# Patient Record
Sex: FEMALE
[De-identification: names, ages, dates, MRNs, and addresses within clinical notes are randomized per-mention and may not be internally consistent; named-entity substitution may affect disease eponyms.]

---

## 2017-08-29 PROBLEM — M17.0 BILATERAL PRIMARY OSTEOARTHRITIS OF KNEE: Status: ACTIVE | Noted: 2017-08-29

## 2017-10-24 PROBLEM — M77.02 GOLFER'S ELBOW, LEFT: Status: ACTIVE | Noted: 2017-10-24

## 2018-02-07 LAB
HCG SERPL QL: NEGATIVE
HGB BLD-MCNC: 15.3 GM/DL (ref 12–15.5)
INR PPP: 1
PROTHROMBIN TIME: 10.3 SECONDS (ref 9.7–11.8)
PROTHROMBIN TIME: NORMAL

## 2018-02-14 ENCOUNTER — HOSPITAL (OUTPATIENT)
Dept: OTHER | Age: 48
End: 2018-02-14
Attending: ORTHOPAEDIC SURGERY

## 2018-02-14 ENCOUNTER — CHARTING TRANS (OUTPATIENT)
Dept: OTHER | Age: 48
End: 2018-02-14

## 2018-02-14 LAB
ANALYZER ANC (IANC): ABNORMAL
BASOPHILS # BLD: 0 THOUSAND/MCL (ref 0–0.3)
BASOPHILS NFR BLD: 0 %
DIFFERENTIAL METHOD BLD: ABNORMAL
EOSINOPHIL # BLD: 0.1 THOUSAND/MCL (ref 0.1–0.5)
EOSINOPHIL NFR BLD: 1 %
ERYTHROCYTE [DISTWIDTH] IN BLOOD: 14 % (ref 11–15)
HEMATOCRIT: 33.6 % (ref 36–46.5)
HEMATOCRIT: 41.4 % (ref 36–46.5)
HGB BLD-MCNC: 10.8 GM/DL (ref 12–15.5)
HGB BLD-MCNC: 13.6 GM/DL (ref 12–15.5)
LYMPHOCYTES # BLD: 2.2 THOUSAND/MCL (ref 1–4.8)
LYMPHOCYTES NFR BLD: 14 %
MCH RBC QN AUTO: 29.8 PG (ref 26–34)
MCHC RBC AUTO-ENTMCNC: 32.1 GM/DL (ref 32–36.5)
MCV RBC AUTO: 92.8 FL (ref 78–100)
MONOCYTES # BLD: 1.3 THOUSAND/MCL (ref 0.3–0.9)
MONOCYTES NFR BLD: 8 %
NEUTROPHILS # BLD: 12.3 THOUSAND/MCL (ref 1.8–7.7)
NEUTROPHILS NFR BLD: 77 %
NEUTS SEG NFR BLD: ABNORMAL %
PERCENT NRBC: ABNORMAL
PLATELET # BLD: 357 THOUSAND/MCL (ref 140–450)
RBC # BLD: 3.62 MILLION/MCL (ref 4–5.2)
WBC # BLD: 15.9 THOUSAND/MCL (ref 4.2–11)

## 2018-02-15 ENCOUNTER — DIAGNOSTIC TRANS (OUTPATIENT)
Dept: OTHER | Age: 48
End: 2018-02-15

## 2018-02-15 ENCOUNTER — CHARTING TRANS (OUTPATIENT)
Dept: OTHER | Age: 48
End: 2018-02-15

## 2018-02-15 LAB
ALBUMIN SERPL-MCNC: 2.5 GM/DL (ref 3.6–5.1)
ALBUMIN/GLOB SERPL: 0.7 {RATIO} (ref 1–2.4)
ALP SERPL-CCNC: 62 UNIT/L (ref 45–117)
ALT SERPL-CCNC: 20 UNIT/L
AMORPH SED URNS QL MICRO: ABNORMAL
ANALYZER ANC (IANC): ABNORMAL
ANION GAP SERPL CALC-SCNC: 16 MMOL/L (ref 10–20)
APPEARANCE UR: CLEAR
APTT PPP: 27 SECONDS (ref 22–30)
APTT PPP: NORMAL S
AST SERPL-CCNC: 37 UNIT/L
BACTERIA #/AREA URNS HPF: ABNORMAL /HPF
BASOPHILS # BLD: 0.1 THOUSAND/MCL (ref 0–0.3)
BASOPHILS NFR BLD: 1 %
BILIRUB SERPL-MCNC: 0.9 MG/DL (ref 0.2–1)
BILIRUB UR QL: NEGATIVE
BUN SERPL-MCNC: 14 MG/DL (ref 6–20)
BUN/CREAT SERPL: 13 (ref 7–25)
CALCIUM SERPL-MCNC: 7.6 MG/DL (ref 8.4–10.2)
CAOX CRY URNS QL MICRO: ABNORMAL
CHLORIDE: 101 MMOL/L (ref 98–107)
CO2 SERPL-SCNC: 20 MMOL/L (ref 21–32)
COLOR UR: YELLOW
CREAT SERPL-MCNC: 1.09 MG/DL (ref 0.51–0.95)
DIFFERENTIAL METHOD BLD: ABNORMAL
EOSINOPHIL # BLD: 0 THOUSAND/MCL (ref 0.1–0.5)
EOSINOPHIL NFR BLD: 0 %
EPITH CASTS #/AREA URNS LPF: ABNORMAL /[LPF]
ERYTHROCYTE [DISTWIDTH] IN BLOOD: 14 % (ref 11–15)
FATTY CASTS #/AREA URNS LPF: ABNORMAL /[LPF]
GLOBULIN SER-MCNC: 3.7 GM/DL (ref 2–4)
GLUCOSE BLDC GLUCOMTR-MCNC: 134 MG/DL (ref 65–99)
GLUCOSE SERPL-MCNC: 144 MG/DL (ref 65–99)
GLUCOSE UR-MCNC: NEGATIVE MG/DL
GRAN CASTS #/AREA URNS LPF: ABNORMAL /[LPF]
HEMATOCRIT: 31.9 % (ref 36–46.5)
HEMATOCRIT: 33.3 % (ref 36–46.5)
HGB BLD-MCNC: 10.4 GM/DL (ref 12–15.5)
HGB BLD-MCNC: 10.7 GM/DL (ref 12–15.5)
HGB UR QL: ABNORMAL
HYALINE CASTS #/AREA URNS LPF: ABNORMAL /LPF (ref 0–5)
INR PPP: 1
KETONES UR-MCNC: NEGATIVE MG/DL
LACTATE BLDV-SCNC: 1.5 MMOL/L (ref 0–2)
LEUKOCYTE ESTERASE UR QL STRIP: NEGATIVE
LYMPHOCYTES # BLD: 2.1 THOUSAND/MCL (ref 1–4.8)
LYMPHOCYTES NFR BLD: 15 %
MCH RBC QN AUTO: 29.5 PG (ref 26–34)
MCHC RBC AUTO-ENTMCNC: 32.1 GM/DL (ref 32–36.5)
MCV RBC AUTO: 91.7 FL (ref 78–100)
METAMYELOCYTES NFR BLD: 1 % (ref 0–2)
MIXED CELL CASTS #/AREA URNS LPF: ABNORMAL /[LPF]
MONOCYTES # BLD: 1.3 THOUSAND/MCL (ref 0.3–0.9)
MONOCYTES NFR BLD: 10 %
MUCOUS THREADS URNS QL MICRO: ABNORMAL
NEUTROPHILS # BLD: 9.4 THOUSAND/MCL (ref 1.8–7.7)
NEUTS BAND NFR BLD: 2 % (ref 0–10)
NEUTS SEG NFR BLD: 70 %
NITRITE UR QL: NEGATIVE
PATH REV BLD -IMP: ABNORMAL
PH UR: 6 UNIT (ref 5–7)
PLAT MORPH BLD: NORMAL
PLATELET # BLD: 371 THOUSAND/MCL (ref 140–450)
POTASSIUM SERPL-SCNC: 4.7 MMOL/L (ref 3.4–5.1)
PROT SERPL-MCNC: 6.2 GM/DL (ref 6.4–8.2)
PROT UR QL: NEGATIVE MG/DL
PROTHROMBIN TIME: 11.1 SECONDS (ref 9.7–11.8)
PROTHROMBIN TIME: NORMAL
RBC # BLD: 3.63 MILLION/MCL (ref 4–5.2)
RBC #/AREA URNS HPF: ABNORMAL /HPF (ref 0–3)
RBC CASTS #/AREA URNS LPF: ABNORMAL /[LPF]
RBC MORPH BLD: NORMAL
RENAL EPI CELLS #/AREA URNS HPF: ABNORMAL /[HPF]
SODIUM SERPL-SCNC: 132 MMOL/L (ref 135–145)
SP GR UR: 1.01 (ref 1–1.03)
SPECIMEN SOURCE: ABNORMAL
SPERM URNS QL MICRO: ABNORMAL
SQUAMOUS #/AREA URNS HPF: ABNORMAL /HPF (ref 0–5)
T VAGINALIS URNS QL MICRO: ABNORMAL
TRI-PHOS CRY URNS QL MICRO: ABNORMAL
URATE CRY URNS QL MICRO: ABNORMAL
URINE REFLEX: ABNORMAL
URNS CMNT MICRO: ABNORMAL
UROBILINOGEN UR QL: 0.2 MG/DL (ref 0–1)
VARIANT LYMPHS NFR BLD: 1 % (ref 0–5)
WAXY CASTS #/AREA URNS LPF: ABNORMAL /[LPF]
WBC # BLD: 13 THOUSAND/MCL (ref 4.2–11)
WBC #/AREA URNS HPF: ABNORMAL /HPF (ref 0–5)
WBC CASTS #/AREA URNS LPF: ABNORMAL /[LPF]
WBC MORPH BLD: NORMAL
YEAST HYPHAE URNS QL MICRO: ABNORMAL
YEAST URNS QL MICRO: ABNORMAL

## 2018-02-16 LAB
ANION GAP SERPL CALC-SCNC: 12 MMOL/L (ref 10–20)
BUN SERPL-MCNC: 11 MG/DL (ref 6–20)
BUN/CREAT SERPL: 11 (ref 7–25)
CALCIUM SERPL-MCNC: 7.8 MG/DL (ref 8.4–10.2)
CHLORIDE: 102 MMOL/L (ref 98–107)
CO2 SERPL-SCNC: 23 MMOL/L (ref 21–32)
CREAT SERPL-MCNC: 0.96 MG/DL (ref 0.51–0.95)
GLUCOSE SERPL-MCNC: 150 MG/DL (ref 65–99)
HEMATOCRIT: 27.1 % (ref 36–46.5)
HGB BLD-MCNC: 8.7 GM/DL (ref 12–15.5)
POTASSIUM SERPL-SCNC: 4.3 MMOL/L (ref 3.4–5.1)
SODIUM SERPL-SCNC: 133 MMOL/L (ref 135–145)
T3FREE SERPL-MCNC: 1.7 PG/ML (ref 2.2–4)
T4 FREE SERPL-MCNC: 1.4 NG/DL (ref 0.8–1.5)
TSH SERPL-ACNC: 0.22 MCUNIT/ML (ref 0.35–5)
WBC # BLD: 14.9 THOUSAND/MCL (ref 4.2–11)

## 2018-02-17 LAB
ALBUMIN SERPL-MCNC: 1.9 GM/DL (ref 3.6–5.1)
ALP SERPL-CCNC: 68 UNIT/L (ref 45–117)
ALT SERPL-CCNC: 15 UNIT/L
ANALYZER ANC (IANC): ABNORMAL
ANION GAP SERPL CALC-SCNC: 13 MMOL/L (ref 10–20)
AST SERPL-CCNC: 45 UNIT/L
BILIRUB CONJ SERPL-MCNC: 0.2 MG/DL (ref 0–0.2)
BILIRUB SERPL-MCNC: 0.7 MG/DL (ref 0.2–1)
BUN SERPL-MCNC: 8 MG/DL (ref 6–20)
BUN/CREAT SERPL: 12 (ref 7–25)
CALCIUM SERPL-MCNC: 8 MG/DL (ref 8.4–10.2)
CHLORIDE: 104 MMOL/L (ref 98–107)
CO2 SERPL-SCNC: 24 MMOL/L (ref 21–32)
CREAT SERPL-MCNC: 0.65 MG/DL (ref 0.51–0.95)
ERYTHROCYTE [DISTWIDTH] IN BLOOD: 13.9 % (ref 11–15)
GLUCOSE SERPL-MCNC: 119 MG/DL (ref 65–99)
HAPTOGLOB SERPL-MCNC: 264 MG/DL (ref 36–195)
HEMATOCRIT: 24.1 % (ref 36–46.5)
HEMATOCRIT: 25.2 % (ref 36–46.5)
HGB BLD-MCNC: 8 GM/DL (ref 12–15.5)
HGB BLD-MCNC: 8.3 GM/DL (ref 12–15.5)
IMMATURE RETIC FRACTION: 15.8 % (ref 1.5–16)
LDH SERPL-CCNC: 241 UNIT/L (ref 82–240)
MCH RBC QN AUTO: 30.3 PG (ref 26–34)
MCHC RBC AUTO-ENTMCNC: 33.2 GM/DL (ref 32–36.5)
MCV RBC AUTO: 91.3 FL (ref 78–100)
PLATELET # BLD: 262 THOUSAND/MCL (ref 140–450)
POTASSIUM SERPL-SCNC: 3.7 MMOL/L (ref 3.4–5.1)
PROT SERPL-MCNC: 5.8 GM/DL (ref 6.4–8.2)
RBC # BLD: 2.64 MILLION/MCL (ref 4–5.2)
RETICS #: 71 THOUSAND/MCL (ref 10–120)
RETICS/RBC NFR: 2.6 % (ref 0.3–2.5)
SODIUM SERPL-SCNC: 137 MMOL/L (ref 135–145)
WBC # BLD: 12.5 THOUSAND/MCL (ref 4.2–11)

## 2018-02-18 LAB
ANALYZER ANC (IANC): ABNORMAL
ANION GAP SERPL CALC-SCNC: 11 MMOL/L (ref 10–20)
BUN SERPL-MCNC: 8 MG/DL (ref 6–20)
BUN/CREAT SERPL: 16 (ref 7–25)
CALCIUM SERPL-MCNC: 8 MG/DL (ref 8.4–10.2)
CHLORIDE: 107 MMOL/L (ref 98–107)
CO2 SERPL-SCNC: 25 MMOL/L (ref 21–32)
CREAT SERPL-MCNC: 0.49 MG/DL (ref 0.51–0.95)
ERYTHROCYTE [DISTWIDTH] IN BLOOD: 14.3 % (ref 11–15)
GLUCOSE SERPL-MCNC: 103 MG/DL (ref 65–99)
HEMATOCRIT: 22.6 % (ref 36–46.5)
HEMATOCRIT: 25.9 % (ref 36–46.5)
HGB BLD-MCNC: 7.4 GM/DL (ref 12–15.5)
HGB BLD-MCNC: 8.6 GM/DL (ref 12–15.5)
MAGNESIUM SERPL-MCNC: 1.8 MG/DL (ref 1.7–2.4)
MCH RBC QN AUTO: 30 PG (ref 26–34)
MCHC RBC AUTO-ENTMCNC: 32.7 GM/DL (ref 32–36.5)
MCV RBC AUTO: 91.5 FL (ref 78–100)
PLATELET # BLD: 324 THOUSAND/MCL (ref 140–450)
POTASSIUM SERPL-SCNC: 3.4 MMOL/L (ref 3.4–5.1)
RBC # BLD: 2.47 MILLION/MCL (ref 4–5.2)
SODIUM SERPL-SCNC: 140 MMOL/L (ref 135–145)
WBC # BLD: 10.9 THOUSAND/MCL (ref 4.2–11)

## 2018-02-19 LAB
ANALYZER ANC (IANC): ABNORMAL
ANION GAP SERPL CALC-SCNC: 10 MMOL/L (ref 10–20)
BUN SERPL-MCNC: 8 MG/DL (ref 6–20)
BUN/CREAT SERPL: 18 (ref 7–25)
CALCIUM SERPL-MCNC: 8.2 MG/DL (ref 8.4–10.2)
CHLORIDE: 107 MMOL/L (ref 98–107)
CO2 SERPL-SCNC: 26 MMOL/L (ref 21–32)
CREAT SERPL-MCNC: 0.45 MG/DL (ref 0.51–0.95)
ERYTHROCYTE [DISTWIDTH] IN BLOOD: 14.4 % (ref 11–15)
GLUCOSE SERPL-MCNC: 101 MG/DL (ref 65–99)
HEMATOCRIT: 25.2 % (ref 36–46.5)
HEMATOCRIT: 27.2 % (ref 36–46.5)
HGB BLD-MCNC: 8.4 GM/DL (ref 12–15.5)
HGB BLD-MCNC: 8.9 GM/DL (ref 12–15.5)
MAGNESIUM SERPL-MCNC: 2 MG/DL (ref 1.7–2.4)
MCH RBC QN AUTO: 30.5 PG (ref 26–34)
MCHC RBC AUTO-ENTMCNC: 33.3 GM/DL (ref 32–36.5)
MCV RBC AUTO: 91.6 FL (ref 78–100)
PLATELET # BLD: 384 THOUSAND/MCL (ref 140–450)
POTASSIUM SERPL-SCNC: 3.8 MMOL/L (ref 3.4–5.1)
RBC # BLD: 2.75 MILLION/MCL (ref 4–5.2)
SODIUM SERPL-SCNC: 139 MMOL/L (ref 135–145)
WBC # BLD: 8.7 THOUSAND/MCL (ref 4.2–11)

## 2018-02-20 LAB
HEMATOCRIT: 26.9 % (ref 36–46.5)
HGB BLD-MCNC: 8.7 GM/DL (ref 12–15.5)

## 2018-04-02 PROBLEM — Z96.653 HISTORY OF KNEE REPLACEMENT, TOTAL, BILATERAL: Status: ACTIVE | Noted: 2018-04-02

## 2018-04-05 PROBLEM — S76.112A RUPTURE OF LEFT QUADRICEPS MUSCLE, INITIAL ENCOUNTER: Status: ACTIVE | Noted: 2018-04-05

## 2018-04-06 RX ORDER — CETIRIZINE HYDROCHLORIDE 10 MG/1
10 TABLET ORAL NIGHTLY
COMMUNITY
End: 2020-03-02

## 2018-04-06 RX ORDER — GLUCOSAMINE HCL 500 MG
1 TABLET ORAL DAILY
COMMUNITY
End: 2020-03-02

## 2018-04-06 RX ORDER — GABAPENTIN 300 MG/1
300 CAPSULE ORAL 3 TIMES DAILY
COMMUNITY
End: 2018-04-27

## 2018-04-06 RX ORDER — DEXTROAMPHETAMINE SACCHARATE, AMPHETAMINE ASPARTATE, DEXTROAMPHETAMINE SULFATE AND AMPHETAMINE SULFATE 2.5; 2.5; 2.5; 2.5 MG/1; MG/1; MG/1; MG/1
10 TABLET ORAL DAILY
COMMUNITY
End: 2018-05-25 | Stop reason: DRUGHIGH

## 2018-04-06 RX ORDER — MONTELUKAST SODIUM 10 MG/1
10 TABLET ORAL NIGHTLY
COMMUNITY
End: 2018-04-27 | Stop reason: ALTCHOICE

## 2018-04-07 ENCOUNTER — LAB ENCOUNTER (OUTPATIENT)
Dept: LAB | Facility: HOSPITAL | Age: 48
End: 2018-04-07
Attending: ORTHOPAEDIC SURGERY
Payer: COMMERCIAL

## 2018-04-07 DIAGNOSIS — Z01.818 PREOP TESTING: ICD-10-CM

## 2018-04-07 PROCEDURE — 87641 MR-STAPH DNA AMP PROBE: CPT

## 2018-04-11 ENCOUNTER — ANESTHESIA EVENT (OUTPATIENT)
Dept: SURGERY | Facility: HOSPITAL | Age: 48
End: 2018-04-11
Payer: COMMERCIAL

## 2018-04-11 ENCOUNTER — SURGERY (OUTPATIENT)
Age: 48
End: 2018-04-11

## 2018-04-11 ENCOUNTER — ANESTHESIA (OUTPATIENT)
Dept: SURGERY | Facility: HOSPITAL | Age: 48
End: 2018-04-11
Payer: COMMERCIAL

## 2018-04-11 ENCOUNTER — HOSPITAL ENCOUNTER (OUTPATIENT)
Facility: HOSPITAL | Age: 48
Discharge: HOME HEALTH | End: 2018-04-12
Attending: ORTHOPAEDIC SURGERY | Admitting: ORTHOPAEDIC SURGERY
Payer: COMMERCIAL

## 2018-04-11 DIAGNOSIS — S76.112A RUPTURE OF QUADRICEPS MUSCLE, LEFT, INITIAL ENCOUNTER: ICD-10-CM

## 2018-04-11 DIAGNOSIS — M25.661 STIFFNESS OF KNEE JOINT, RIGHT: ICD-10-CM

## 2018-04-11 DIAGNOSIS — Z01.818 PREOP TESTING: Primary | ICD-10-CM

## 2018-04-11 LAB — B-HCG UR QL: NEGATIVE

## 2018-04-11 PROCEDURE — 0SND0ZZ RELEASE LEFT KNEE JOINT, OPEN APPROACH: ICD-10-PCS | Performed by: ORTHOPAEDIC SURGERY

## 2018-04-11 PROCEDURE — 0LQM0ZZ REPAIR LEFT UPPER LEG TENDON, OPEN APPROACH: ICD-10-PCS | Performed by: ORTHOPAEDIC SURGERY

## 2018-04-11 PROCEDURE — 99152 MOD SED SAME PHYS/QHP 5/>YRS: CPT | Performed by: ORTHOPAEDIC SURGERY

## 2018-04-11 PROCEDURE — 64447 NJX AA&/STRD FEMORAL NRV IMG: CPT | Performed by: ORTHOPAEDIC SURGERY

## 2018-04-11 PROCEDURE — 0JQP0ZZ REPAIR LEFT LOWER LEG SUBCUTANEOUS TISSUE AND FASCIA, OPEN APPROACH: ICD-10-PCS | Performed by: ORTHOPAEDIC SURGERY

## 2018-04-11 PROCEDURE — 0SNCXZZ RELEASE RIGHT KNEE JOINT, EXTERNAL APPROACH: ICD-10-PCS | Performed by: ORTHOPAEDIC SURGERY

## 2018-04-11 PROCEDURE — 3E0T3BZ INTRODUCTION OF ANESTHETIC AGENT INTO PERIPHERAL NERVES AND PLEXI, PERCUTANEOUS APPROACH: ICD-10-PCS | Performed by: ANESTHESIOLOGY

## 2018-04-11 PROCEDURE — 94660 CPAP INITIATION&MGMT: CPT

## 2018-04-11 PROCEDURE — 81025 URINE PREGNANCY TEST: CPT

## 2018-04-11 PROCEDURE — 76942 ECHO GUIDE FOR BIOPSY: CPT | Performed by: ORTHOPAEDIC SURGERY

## 2018-04-11 RX ORDER — BUPROPION HYDROCHLORIDE 100 MG/1
150 TABLET ORAL DAILY
Status: DISCONTINUED | OUTPATIENT
Start: 2018-04-11 | End: 2018-04-12

## 2018-04-11 RX ORDER — HYDROCODONE BITARTRATE AND ACETAMINOPHEN 10; 325 MG/1; MG/1
1 TABLET ORAL EVERY 4 HOURS PRN
Status: DISCONTINUED | OUTPATIENT
Start: 2018-04-11 | End: 2018-04-12

## 2018-04-11 RX ORDER — DEXAMETHASONE SODIUM PHOSPHATE 4 MG/ML
VIAL (ML) INJECTION AS NEEDED
Status: DISCONTINUED | OUTPATIENT
Start: 2018-04-11 | End: 2018-04-11 | Stop reason: SURG

## 2018-04-11 RX ORDER — MORPHINE SULFATE 4 MG/ML
2 INJECTION, SOLUTION INTRAMUSCULAR; INTRAVENOUS EVERY 10 MIN PRN
Status: DISCONTINUED | OUTPATIENT
Start: 2018-04-11 | End: 2018-04-11 | Stop reason: HOSPADM

## 2018-04-11 RX ORDER — HALOPERIDOL 5 MG/ML
0.25 INJECTION INTRAMUSCULAR ONCE AS NEEDED
Status: DISCONTINUED | OUTPATIENT
Start: 2018-04-11 | End: 2018-04-11 | Stop reason: HOSPADM

## 2018-04-11 RX ORDER — SODIUM CHLORIDE, SODIUM LACTATE, POTASSIUM CHLORIDE, CALCIUM CHLORIDE 600; 310; 30; 20 MG/100ML; MG/100ML; MG/100ML; MG/100ML
INJECTION, SOLUTION INTRAVENOUS CONTINUOUS
Status: DISCONTINUED | OUTPATIENT
Start: 2018-04-11 | End: 2018-04-11 | Stop reason: HOSPADM

## 2018-04-11 RX ORDER — ASPIRIN 325 MG
325 TABLET ORAL DAILY
Status: DISCONTINUED | OUTPATIENT
Start: 2018-04-12 | End: 2018-04-12

## 2018-04-11 RX ORDER — LABETALOL HYDROCHLORIDE 5 MG/ML
INJECTION, SOLUTION INTRAVENOUS AS NEEDED
Status: DISCONTINUED | OUTPATIENT
Start: 2018-04-11 | End: 2018-04-11 | Stop reason: SURG

## 2018-04-11 RX ORDER — ONDANSETRON 2 MG/ML
4 INJECTION INTRAMUSCULAR; INTRAVENOUS ONCE AS NEEDED
Status: DISCONTINUED | OUTPATIENT
Start: 2018-04-11 | End: 2018-04-11 | Stop reason: HOSPADM

## 2018-04-11 RX ORDER — BUPIVACAINE HYDROCHLORIDE 2.5 MG/ML
INJECTION, SOLUTION EPIDURAL; INFILTRATION; INTRACAUDAL AS NEEDED
Status: DISCONTINUED | OUTPATIENT
Start: 2018-04-11 | End: 2018-04-11

## 2018-04-11 RX ORDER — HYDROCODONE BITARTRATE AND ACETAMINOPHEN 5; 325 MG/1; MG/1
2 TABLET ORAL AS NEEDED
Status: DISCONTINUED | OUTPATIENT
Start: 2018-04-11 | End: 2018-04-11 | Stop reason: HOSPADM

## 2018-04-11 RX ORDER — BACLOFEN 10 MG/1
5 TABLET ORAL 2 TIMES DAILY
Status: DISCONTINUED | OUTPATIENT
Start: 2018-04-11 | End: 2018-04-12

## 2018-04-11 RX ORDER — KETOROLAC TROMETHAMINE 30 MG/ML
30 INJECTION, SOLUTION INTRAMUSCULAR; INTRAVENOUS ONCE
Status: COMPLETED | OUTPATIENT
Start: 2018-04-11 | End: 2018-04-11

## 2018-04-11 RX ORDER — MORPHINE SULFATE 10 MG/ML
6 INJECTION, SOLUTION INTRAMUSCULAR; INTRAVENOUS EVERY 10 MIN PRN
Status: DISCONTINUED | OUTPATIENT
Start: 2018-04-11 | End: 2018-04-11 | Stop reason: HOSPADM

## 2018-04-11 RX ORDER — ROPIVACAINE HYDROCHLORIDE 5 MG/ML
INJECTION, SOLUTION EPIDURAL; INFILTRATION; PERINEURAL AS NEEDED
Status: DISCONTINUED | OUTPATIENT
Start: 2018-04-11 | End: 2018-04-11 | Stop reason: SURG

## 2018-04-11 RX ORDER — HYDROCODONE BITARTRATE AND ACETAMINOPHEN 10; 325 MG/1; MG/1
2 TABLET ORAL EVERY 4 HOURS PRN
Status: DISCONTINUED | OUTPATIENT
Start: 2018-04-11 | End: 2018-04-12

## 2018-04-11 RX ORDER — LIDOCAINE HYDROCHLORIDE 10 MG/ML
INJECTION, SOLUTION EPIDURAL; INFILTRATION; INTRACAUDAL; PERINEURAL AS NEEDED
Status: DISCONTINUED | OUTPATIENT
Start: 2018-04-11 | End: 2018-04-11 | Stop reason: SURG

## 2018-04-11 RX ORDER — MORPHINE SULFATE 2 MG/ML
1 INJECTION, SOLUTION INTRAMUSCULAR; INTRAVENOUS EVERY 2 HOUR PRN
Status: DISCONTINUED | OUTPATIENT
Start: 2018-04-11 | End: 2018-04-12

## 2018-04-11 RX ORDER — NEOSTIGMINE METHYLSULFATE 0.5 MG/ML
INJECTION INTRAVENOUS AS NEEDED
Status: DISCONTINUED | OUTPATIENT
Start: 2018-04-11 | End: 2018-04-11 | Stop reason: SURG

## 2018-04-11 RX ORDER — FAMOTIDINE 20 MG/1
20 TABLET ORAL ONCE
Status: COMPLETED | OUTPATIENT
Start: 2018-04-11 | End: 2018-04-11

## 2018-04-11 RX ORDER — ATORVASTATIN CALCIUM 10 MG/1
10 TABLET, FILM COATED ORAL NIGHTLY
Status: DISCONTINUED | OUTPATIENT
Start: 2018-04-11 | End: 2018-04-12

## 2018-04-11 RX ORDER — MORPHINE SULFATE 4 MG/ML
4 INJECTION, SOLUTION INTRAMUSCULAR; INTRAVENOUS EVERY 10 MIN PRN
Status: DISCONTINUED | OUTPATIENT
Start: 2018-04-11 | End: 2018-04-11 | Stop reason: HOSPADM

## 2018-04-11 RX ORDER — MIDAZOLAM HYDROCHLORIDE 1 MG/ML
INJECTION INTRAMUSCULAR; INTRAVENOUS AS NEEDED
Status: DISCONTINUED | OUTPATIENT
Start: 2018-04-11 | End: 2018-04-11 | Stop reason: SURG

## 2018-04-11 RX ORDER — DOCUSATE SODIUM 100 MG/1
100 CAPSULE, LIQUID FILLED ORAL 2 TIMES DAILY
Status: DISCONTINUED | OUTPATIENT
Start: 2018-04-11 | End: 2018-04-12

## 2018-04-11 RX ORDER — NALOXONE HYDROCHLORIDE 0.4 MG/ML
80 INJECTION, SOLUTION INTRAMUSCULAR; INTRAVENOUS; SUBCUTANEOUS AS NEEDED
Status: DISCONTINUED | OUTPATIENT
Start: 2018-04-11 | End: 2018-04-11 | Stop reason: HOSPADM

## 2018-04-11 RX ORDER — MONTELUKAST SODIUM 10 MG/1
10 TABLET ORAL NIGHTLY
Status: DISCONTINUED | OUTPATIENT
Start: 2018-04-11 | End: 2018-04-12

## 2018-04-11 RX ORDER — ONDANSETRON 2 MG/ML
INJECTION INTRAMUSCULAR; INTRAVENOUS AS NEEDED
Status: DISCONTINUED | OUTPATIENT
Start: 2018-04-11 | End: 2018-04-11 | Stop reason: SURG

## 2018-04-11 RX ORDER — MAGNESIUM HYDROXIDE 1200 MG/15ML
LIQUID ORAL CONTINUOUS PRN
Status: DISCONTINUED | OUTPATIENT
Start: 2018-04-11 | End: 2018-04-11

## 2018-04-11 RX ORDER — HYDROMORPHONE HYDROCHLORIDE 1 MG/ML
0.6 INJECTION, SOLUTION INTRAMUSCULAR; INTRAVENOUS; SUBCUTANEOUS EVERY 5 MIN PRN
Status: DISCONTINUED | OUTPATIENT
Start: 2018-04-11 | End: 2018-04-11 | Stop reason: HOSPADM

## 2018-04-11 RX ORDER — GABAPENTIN 300 MG/1
300 CAPSULE ORAL 3 TIMES DAILY
Status: DISCONTINUED | OUTPATIENT
Start: 2018-04-11 | End: 2018-04-12

## 2018-04-11 RX ORDER — OXYCODONE HYDROCHLORIDE 5 MG/1
5 TABLET ORAL EVERY 4 HOURS PRN
Status: DISCONTINUED | OUTPATIENT
Start: 2018-04-11 | End: 2018-04-12

## 2018-04-11 RX ORDER — ACETAMINOPHEN 325 MG/1
650 TABLET ORAL EVERY 4 HOURS PRN
Status: DISCONTINUED | OUTPATIENT
Start: 2018-04-11 | End: 2018-04-12

## 2018-04-11 RX ORDER — ACETAMINOPHEN 500 MG
1000 TABLET ORAL ONCE
Status: COMPLETED | OUTPATIENT
Start: 2018-04-11 | End: 2018-04-11

## 2018-04-11 RX ORDER — ESCITALOPRAM OXALATE 10 MG/1
20 TABLET ORAL DAILY
Status: DISCONTINUED | OUTPATIENT
Start: 2018-04-11 | End: 2018-04-12

## 2018-04-11 RX ORDER — POLYETHYLENE GLYCOL 3350 17 G/17G
17 POWDER, FOR SOLUTION ORAL DAILY PRN
Status: DISCONTINUED | OUTPATIENT
Start: 2018-04-11 | End: 2018-04-12

## 2018-04-11 RX ORDER — ONDANSETRON 2 MG/ML
4 INJECTION INTRAMUSCULAR; INTRAVENOUS EVERY 6 HOURS PRN
Status: DISCONTINUED | OUTPATIENT
Start: 2018-04-11 | End: 2018-04-12

## 2018-04-11 RX ORDER — MORPHINE SULFATE 4 MG/ML
4 INJECTION, SOLUTION INTRAMUSCULAR; INTRAVENOUS EVERY 2 HOUR PRN
Status: DISCONTINUED | OUTPATIENT
Start: 2018-04-11 | End: 2018-04-12

## 2018-04-11 RX ORDER — MORPHINE SULFATE 15 MG/1
15 TABLET ORAL EVERY 4 HOURS PRN
Status: DISCONTINUED | OUTPATIENT
Start: 2018-04-11 | End: 2018-04-12

## 2018-04-11 RX ORDER — BISACODYL 10 MG
10 SUPPOSITORY, RECTAL RECTAL
Status: DISCONTINUED | OUTPATIENT
Start: 2018-04-11 | End: 2018-04-12

## 2018-04-11 RX ORDER — ACETAMINOPHEN 500 MG
1000 TABLET ORAL EVERY 8 HOURS
Status: DISCONTINUED | OUTPATIENT
Start: 2018-04-11 | End: 2018-04-12

## 2018-04-11 RX ORDER — SODIUM PHOSPHATE, DIBASIC AND SODIUM PHOSPHATE, MONOBASIC 7; 19 G/133ML; G/133ML
1 ENEMA RECTAL ONCE AS NEEDED
Status: DISCONTINUED | OUTPATIENT
Start: 2018-04-11 | End: 2018-04-12

## 2018-04-11 RX ORDER — ROCURONIUM BROMIDE 10 MG/ML
INJECTION, SOLUTION INTRAVENOUS AS NEEDED
Status: DISCONTINUED | OUTPATIENT
Start: 2018-04-11 | End: 2018-04-11 | Stop reason: SURG

## 2018-04-11 RX ORDER — DIAZEPAM 5 MG/1
5 TABLET ORAL NIGHTLY PRN
Status: DISCONTINUED | OUTPATIENT
Start: 2018-04-11 | End: 2018-04-12

## 2018-04-11 RX ORDER — TRAZODONE HYDROCHLORIDE 50 MG/1
50 TABLET ORAL NIGHTLY PRN
Status: DISCONTINUED | OUTPATIENT
Start: 2018-04-11 | End: 2018-04-12

## 2018-04-11 RX ORDER — ALBUTEROL SULFATE 90 UG/1
1 AEROSOL, METERED RESPIRATORY (INHALATION) EVERY 6 HOURS PRN
Status: DISCONTINUED | OUTPATIENT
Start: 2018-04-11 | End: 2018-04-12

## 2018-04-11 RX ORDER — HYDROMORPHONE HYDROCHLORIDE 1 MG/ML
0.4 INJECTION, SOLUTION INTRAMUSCULAR; INTRAVENOUS; SUBCUTANEOUS EVERY 5 MIN PRN
Status: DISCONTINUED | OUTPATIENT
Start: 2018-04-11 | End: 2018-04-11 | Stop reason: HOSPADM

## 2018-04-11 RX ORDER — DEXAMETHASONE SODIUM PHOSPHATE 10 MG/ML
INJECTION, SOLUTION INTRAMUSCULAR; INTRAVENOUS AS NEEDED
Status: DISCONTINUED | OUTPATIENT
Start: 2018-04-11 | End: 2018-04-11 | Stop reason: SURG

## 2018-04-11 RX ORDER — HYDROCODONE BITARTRATE AND ACETAMINOPHEN 5; 325 MG/1; MG/1
1 TABLET ORAL AS NEEDED
Status: DISCONTINUED | OUTPATIENT
Start: 2018-04-11 | End: 2018-04-11 | Stop reason: HOSPADM

## 2018-04-11 RX ORDER — MORPHINE SULFATE 2 MG/ML
2 INJECTION, SOLUTION INTRAMUSCULAR; INTRAVENOUS EVERY 2 HOUR PRN
Status: DISCONTINUED | OUTPATIENT
Start: 2018-04-11 | End: 2018-04-12

## 2018-04-11 RX ORDER — HYDROMORPHONE HYDROCHLORIDE 1 MG/ML
0.2 INJECTION, SOLUTION INTRAMUSCULAR; INTRAVENOUS; SUBCUTANEOUS EVERY 5 MIN PRN
Status: DISCONTINUED | OUTPATIENT
Start: 2018-04-11 | End: 2018-04-11 | Stop reason: HOSPADM

## 2018-04-11 RX ORDER — GLYCOPYRROLATE 0.2 MG/ML
INJECTION INTRAMUSCULAR; INTRAVENOUS AS NEEDED
Status: DISCONTINUED | OUTPATIENT
Start: 2018-04-11 | End: 2018-04-11 | Stop reason: SURG

## 2018-04-11 RX ORDER — CYCLOBENZAPRINE HCL 10 MG
10 TABLET ORAL 3 TIMES DAILY PRN
Status: DISCONTINUED | OUTPATIENT
Start: 2018-04-11 | End: 2018-04-12

## 2018-04-11 RX ORDER — CETIRIZINE HYDROCHLORIDE 10 MG/1
10 TABLET ORAL NIGHTLY
Status: DISCONTINUED | OUTPATIENT
Start: 2018-04-11 | End: 2018-04-12

## 2018-04-11 RX ORDER — OXYCODONE HYDROCHLORIDE 5 MG/1
10 TABLET ORAL EVERY 4 HOURS PRN
Status: DISCONTINUED | OUTPATIENT
Start: 2018-04-11 | End: 2018-04-12

## 2018-04-11 RX ORDER — LIDOCAINE HYDROCHLORIDE 40 MG/ML
SOLUTION TOPICAL AS NEEDED
Status: DISCONTINUED | OUTPATIENT
Start: 2018-04-11 | End: 2018-04-11 | Stop reason: SURG

## 2018-04-11 RX ORDER — MORPHINE SULFATE 4 MG/ML
6 INJECTION, SOLUTION INTRAMUSCULAR; INTRAVENOUS EVERY 2 HOUR PRN
Status: DISCONTINUED | OUTPATIENT
Start: 2018-04-11 | End: 2018-04-12

## 2018-04-11 RX ORDER — SODIUM CHLORIDE, SODIUM LACTATE, POTASSIUM CHLORIDE, CALCIUM CHLORIDE 600; 310; 30; 20 MG/100ML; MG/100ML; MG/100ML; MG/100ML
INJECTION, SOLUTION INTRAVENOUS CONTINUOUS
Status: DISCONTINUED | OUTPATIENT
Start: 2018-04-11 | End: 2018-04-12

## 2018-04-11 RX ORDER — SODIUM CHLORIDE 0.9 % (FLUSH) 0.9 %
10 SYRINGE (ML) INJECTION AS NEEDED
Status: DISCONTINUED | OUTPATIENT
Start: 2018-04-11 | End: 2018-04-12

## 2018-04-11 RX ADMIN — ROCURONIUM BROMIDE 30 MG: 10 INJECTION, SOLUTION INTRAVENOUS at 15:40:00

## 2018-04-11 RX ADMIN — DEXAMETHASONE SODIUM PHOSPHATE 4 MG: 10 INJECTION, SOLUTION INTRAMUSCULAR; INTRAVENOUS at 14:44:00

## 2018-04-11 RX ADMIN — SODIUM CHLORIDE, SODIUM LACTATE, POTASSIUM CHLORIDE, CALCIUM CHLORIDE: 600; 310; 30; 20 INJECTION, SOLUTION INTRAVENOUS at 15:09:00

## 2018-04-11 RX ADMIN — MIDAZOLAM HYDROCHLORIDE 2 MG: 1 INJECTION INTRAMUSCULAR; INTRAVENOUS at 14:44:00

## 2018-04-11 RX ADMIN — ROPIVACAINE HYDROCHLORIDE 30 ML: 5 INJECTION, SOLUTION EPIDURAL; INFILTRATION; PERINEURAL at 14:44:00

## 2018-04-11 RX ADMIN — LABETALOL HYDROCHLORIDE 10 MG: 5 INJECTION, SOLUTION INTRAVENOUS at 15:35:00

## 2018-04-11 RX ADMIN — NEOSTIGMINE METHYLSULFATE 4 MG: 0.5 INJECTION INTRAVENOUS at 16:33:00

## 2018-04-11 RX ADMIN — ONDANSETRON 4 MG: 2 INJECTION INTRAMUSCULAR; INTRAVENOUS at 15:20:00

## 2018-04-11 RX ADMIN — LIDOCAINE HYDROCHLORIDE 50 MG: 10 INJECTION, SOLUTION EPIDURAL; INFILTRATION; INTRACAUDAL; PERINEURAL at 15:11:00

## 2018-04-11 RX ADMIN — DEXAMETHASONE SODIUM PHOSPHATE 4 MG: 4 MG/ML VIAL (ML) INJECTION at 15:20:00

## 2018-04-11 RX ADMIN — LABETALOL HYDROCHLORIDE 20 MG: 5 INJECTION, SOLUTION INTRAVENOUS at 15:45:00

## 2018-04-11 RX ADMIN — LIDOCAINE HYDROCHLORIDE 1 ML: 10 INJECTION, SOLUTION EPIDURAL; INFILTRATION; INTRACAUDAL; PERINEURAL at 14:44:00

## 2018-04-11 RX ADMIN — SODIUM CHLORIDE, SODIUM LACTATE, POTASSIUM CHLORIDE, CALCIUM CHLORIDE: 600; 310; 30; 20 INJECTION, SOLUTION INTRAVENOUS at 16:38:00

## 2018-04-11 RX ADMIN — GLYCOPYRROLATE 0.6 MG: 0.2 INJECTION INTRAMUSCULAR; INTRAVENOUS at 16:33:00

## 2018-04-11 RX ADMIN — LIDOCAINE HYDROCHLORIDE 4 ML: 40 SOLUTION TOPICAL at 15:11:00

## 2018-04-11 NOTE — ANESTHESIA PROCEDURE NOTES
Peripheral Block    Anesthesiologist:  Jessie Best  Performed by:   Anesthesiologist  Patient Location:  PACU  Start Time:  4/11/2018 2:44 PM  End Time:  4/11/2018 2:48 PM  Site Identification: ultrasound guided, real time ultrasound guided, surface landm

## 2018-04-11 NOTE — CONSULTS
[address]    History & Physical (or consult)    Blanche Liberty Patient Status:  Inpatient    1970 MRN [id number]   Location [facility] UNIT Attending Fredrick Gooden MD   [address] [facility] Procedure: LUMBAR / TRANSFORAMINAL EPIDURAL                STEROID INJECTION;  Surgeon: Greta Hidalgo DO;  Location: [address] [place] [address]  8/13/2015: INJECTION, ANESTHETIC/STEROID, TRANSFORAMINAL * Right      Comment: Procedure: Dat James diazepam (VALIUM) 5 MG Oral Tab Take 1 tablet (5 mg total) by mouth nightly as needed for Anxiety. PROAIR  (90 BASE) MCG/ACT Inhalation Aero Soln Inhale 1 puff into the lungs every 6 (six) hours as needed.      BuPROPion HCl (WELLBUTRIN) 75 MG Or HGB, HCT, MCV, MCH, MCHC, RDW, NEPRELIM, WBC, PLT in the last 72 hours. No results for input(s): GLU, BUN, CREATSERUM, GFRAA, GFRNAA, CA, ALB, NA, K, CL, CO2, ALKPHO, AST, ALT, BILT, TP in the last 72 hours.     No results found for: TSH, LIPASE  No resu

## 2018-04-11 NOTE — ANESTHESIA PREPROCEDURE EVALUATION
Anesthesia PreOp Note    HPI:     Rolando Lee is a 41year old female who presents for preoperative consultation requested by: Matilde Laurent MD    Date of Surgery: 4/11/2018    Procedure(s):  QUADRICEPS RUPTURE REPAIR  KNEE MANIPULATION  Indication: Procedure: LUMBAR / TRANSFORAMINAL EPIDURAL                STEROID INJECTION;  Surgeon: Cottie Nissen, DO;  Location: [address] [place] [address]  8/13/2015: INJECTION, ANESTHETIC/STEROID, TRANSFORAMINAL * Right      Comment: Procedure: Isma Plummer 4/10/2018 at 1900   ibuprofen 800 MG Oral Tab Take 1 tablet (800 mg total) by mouth 3 (three) times daily.  For 2 weeks Disp: 60 tablet Rfl: 3 Past Week at Unknown time   diazepam (VALIUM) 5 MG Oral Tab Take 1 tablet (5 mg total) by mouth nightly as needed 0.0 oz/week     Comment: socially    Drug use: No    Sexual activity: Not on file     Other Topics Concern   None on file     Social History Narrative   None on file       Available pre-op labs reviewed.     Lab Results  Component Value Date   URINEPREG Neg

## 2018-04-11 NOTE — H&P
History & Physical Examination    Patient Name: Keyanna Wade  MRN: [id number]  CSN: [id number]  YOB: 1970    Diagnosis: left quad rupture s/p TKA.   Right knee stiffness    Present Illness: s/p B TKA with decreased ROM right knee and persis Arrhythmia     Has an irregular beat occasionally.  Has been checked out per patient and no further interventions needed   • Asthma    • Attention deficit disorder    • Back problem    • Depression    • Esophageal reflux    • High cholesterol    • History o Lipids Brother         Smoking status: Former Smoker  1.00 Packs/day  For 15.00 Years     Quit date: 1/1/2015    Smokeless tobacco: Current User    Comment: does vapor    Alcohol use Yes  0.0 oz/week     Comment: socially       SYSTEM Check if Review is No

## 2018-04-11 NOTE — ANESTHESIA POSTPROCEDURE EVALUATION
Patient: Enio Arrieta    Procedure Summary     Date:  04/11/18 Room / Location:  [address] [place] [address] MAIN OR 08 / [address] [place] [address] OR    Anesthesia Start:  2963 Anesthesia Stop:  5122    Procedures:       QUADRICEPS RUPTURE REPAIR (Left Knee)      KNEE MANIPULATION (Right Kne

## 2018-04-12 VITALS
OXYGEN SATURATION: 96 % | BODY MASS INDEX: 43.7 KG/M2 | TEMPERATURE: 98 F | RESPIRATION RATE: 20 BRPM | HEIGHT: 63 IN | DIASTOLIC BLOOD PRESSURE: 68 MMHG | SYSTOLIC BLOOD PRESSURE: 126 MMHG | HEART RATE: 84 BPM | WEIGHT: 246.63 LBS

## 2018-04-12 PROCEDURE — 97116 GAIT TRAINING THERAPY: CPT

## 2018-04-12 PROCEDURE — A4216 STERILE WATER/SALINE, 10 ML: HCPCS | Performed by: PHYSICIAN ASSISTANT

## 2018-04-12 PROCEDURE — 97530 THERAPEUTIC ACTIVITIES: CPT

## 2018-04-12 PROCEDURE — 94660 CPAP INITIATION&MGMT: CPT

## 2018-04-12 PROCEDURE — 97162 PT EVAL MOD COMPLEX 30 MIN: CPT

## 2018-04-12 RX ORDER — HYDROCODONE BITARTRATE AND ACETAMINOPHEN 10; 325 MG/1; MG/1
1-2 TABLET ORAL EVERY 4 HOURS PRN
Refills: 0 | Status: SHIPPED | COMMUNITY
Start: 2018-04-12 | End: 2018-04-27 | Stop reason: ALTCHOICE

## 2018-04-12 RX ORDER — ASPIRIN 325 MG
325 TABLET ORAL DAILY
Qty: 30 TABLET | Refills: 0 | Status: SHIPPED | OUTPATIENT
Start: 2018-04-13 | End: 2020-03-02

## 2018-04-12 RX ORDER — TRAZODONE HYDROCHLORIDE 50 MG/1
TABLET ORAL NIGHTLY PRN
Qty: 30 TABLET | Refills: 0 | Status: SHIPPED | OUTPATIENT
Start: 2018-04-12 | End: 2020-03-02

## 2018-04-12 RX ORDER — CARISOPRODOL 350 MG/1
350 TABLET ORAL 4 TIMES DAILY PRN
Refills: 0 | Status: SHIPPED | COMMUNITY
Start: 2018-04-12 | End: 2020-03-02

## 2018-04-12 NOTE — PROGRESS NOTES
[facility]    Progress Note    Gillian Shone Patient Status:  Inpatient    1970 MRN [id number]   Location [facility] 4W/SW/SE Attending Abdulkadir Reece MD   Hosp Day # 1 PCP Eldon Tucker DO     SUBJECTIVE:  Interval Histo

## 2018-04-12 NOTE — DISCHARGE SUMMARY
[address]    Discharge Summary    Piter Singleton Patient Status:  Inpatient    1970 MRN [id number]   Location [facility] 4W/SW/SE Attending Pradeep Velazquez MD   Hosp Day # 1 PCP DO JUAN J Saucedo breastfeeding.     gen- NAD   heent- moist mucus membranes, atraumatic  Lungs-  clear bilaterally, normal respiratory effort  cv-  rrr,  no murmurs, rubs or gallops  abd-  soft, nontender, nondistended, bowel sounds present  Skin- no  diffuse rash  Neuro/py 300 MG Caps  Commonly known as:  NEURONTIN      Take 300 mg by mouth 3 (three) times daily. Refills:  0     ibuprofen 800 MG Tabs  Commonly known as:  MOTRIN      Take 1 tablet (800 mg total) by mouth 3 (three) times daily.  For 2 weeks   Stop taking on:

## 2018-04-12 NOTE — CM/SW NOTE
ETIENNE met with the pt. At bedside. The pt. Lives with her brother and mother in a 2 level home, but resides on the first level with 6 steps to enter. The pt. Has been using the fire department to get up the stairs to enter.   The pt was a [place] for rehab

## 2018-04-12 NOTE — RESPIRATORY THERAPY NOTE
BRENDEN ASSESSMENT:    Pt does have a previous diagnosis of BRENDEN. Pt does routinely use a CPAP device at home.      CPAP INITIATION:    Pt to be placed on CPAP: yes  Pt refused: no  CPAP settings: CPAP at 10 cm H2O/CWP

## 2018-04-12 NOTE — PLAN OF CARE
DISCHARGE PLANNING    • Discharge to home or other facility with appropriate resources Adequate for Discharge    Plan to d/c home today. GENITOURINARY - ADULT    • Absence of urinary retention Adequate for Discharge    Voiding freely.      HEMATOLOGIC - dressings clean, dry, and intact. No other skin issues noted at this time.

## 2018-04-12 NOTE — PHYSICAL THERAPY NOTE
PHYSICAL THERAPY EVALUATION - INPATIENT     Room Number: 420/420-A  Evaluation Date: 4/12/2018  Type of Evaluation: Initial   Physician Order: PT Eval and Treat    Presenting Problem: left quad rupture  Reason for Therapy: Mobility Dysfunction and Dischar Hypercholesterolemia    • Osteoarthritis    • Sleep apnea     Uses CPAP   • Visual impairment     Glasses       Past Surgical History  Past Surgical History:  No date: ADJUSTMENT GASTRIC BAND  11/2015: BACK SURGERY  4/24/2015: INJECTION, ANESTHETIC/STEROID but I can walk.      PHYSICAL THERAPY EXAMINATION     OBJECTIVE  Precautions: Knee immobilizer (at all times left knee)  Fall Risk: Standard fall risk    WEIGHT BEARING RESTRICTION  Weight Bearing Restriction: L lower extremity           L Lower Extremity: Assistance: Minimum assistance (cga)  Distance (ft): 30  Assistive Device: Rolling walker  Pattern: L Decreased stance time (knee immobilizer in place)  Stoop/Curb Assistance: Not tested       Bed Mobility: Pt demo indep with knee immobilizer on left le

## 2018-04-12 NOTE — PHYSICAL THERAPY NOTE
PHYSICAL THERAPY TREATMENT NOTE - INPATIENT     Room Number: 420/420-A       Presenting Problem: left quad rupture    Problem List  Active Problems:    Preop testing      ASSESSMENT   Pt seen for PT treatment session following left quad repair and right kn Moving from lying on back to sitting on the side of the bed?: None   How much help from another person does the patient currently need. ..   -   Moving to and from a bed to a chair (including a wheelchair)?: None   -   Need to walk in hospital room?: A Alden Current Status  Pt able to demo tka hep on rt le, knee immobilizer management on left le   Goal #6

## 2018-04-12 NOTE — OPERATIVE REPORT
[facility]    PATIENT'S NAME: Freida Spike   ATTENDING PHYSICIAN: Gonzalez Mejia MD   OPERATING PHYSICIAN: Gonzalez Mejia MD   PATIENT ACCOUNT#:   [id number]    LOCATION:  [address] [place] [address] #:   [id number]       DATE OF BIR through range of motion which was found to be from approximately 20 to 120 degrees. After forceful manipulation of extension and flexion with audible lysis of adhesions, range of motion was noted to be approximately 3 to 130 degrees.   At this time, attent MD  d: 04/11/2018 21:31:59  t: 04/11/2018 21:42:47  [facility] [id number]  RRG/

## 2018-04-12 NOTE — PLAN OF CARE
DISCHARGE PLANNING    • Discharge to home or other facility with appropriate resources Progressing        GENITOURINARY - ADULT    • Absence of urinary retention Progressing        HEMATOLOGIC - ADULT    • Maintains hematologic stability Progressing    • F PAIN MANAGED WITH PRN MORPHINE PO AND IVP, ICE APPLIED. USES CPAP AT NIGHT, CONT. PULSE OX IN PLACE. RECEIVING ANCEF FOR PROPHYLAXIS. BED LOCKED AND IN LOWEST POSITION, USES CALL LIGHT APPROPRIATELY.  INCISION TO LEFT KNEE COVERED WITH ACE WRAP AND IMMOBILI

## 2018-04-27 PROBLEM — Z96.653 S/P TOTAL KNEE ARTHROPLASTY, BILATERAL: Status: ACTIVE | Noted: 2018-04-02

## 2020-01-02 PROBLEM — M48.061 LUMBAR FORAMINAL STENOSIS: Status: ACTIVE | Noted: 2020-01-02

## 2020-02-07 PROBLEM — M41.9 ACQUIRED SCOLIOSIS: Status: ACTIVE | Noted: 2020-02-07

## 2020-02-07 PROBLEM — M48.061 FORAMINAL STENOSIS OF LUMBAR REGION: Status: ACTIVE | Noted: 2020-01-02

## 2020-03-02 RX ORDER — MONTELUKAST SODIUM 10 MG/1
10 TABLET ORAL NIGHTLY
COMMUNITY

## 2020-03-02 RX ORDER — CETIRIZINE HYDROCHLORIDE 10 MG/1
10 TABLET ORAL EVERY MORNING
COMMUNITY

## 2020-03-06 ENCOUNTER — LAB ENCOUNTER (OUTPATIENT)
Dept: LAB | Facility: HOSPITAL | Age: 50
End: 2020-03-06
Attending: ORTHOPAEDIC SURGERY
Payer: COMMERCIAL

## 2020-03-06 DIAGNOSIS — Z01.818 PREOP TESTING: ICD-10-CM

## 2020-03-06 LAB
ANTIBODY SCREEN: NEGATIVE
RH BLOOD TYPE: POSITIVE

## 2020-03-06 PROCEDURE — 86850 RBC ANTIBODY SCREEN: CPT

## 2020-03-06 PROCEDURE — 86900 BLOOD TYPING SEROLOGIC ABO: CPT

## 2020-03-06 PROCEDURE — 86901 BLOOD TYPING SEROLOGIC RH(D): CPT

## 2020-03-06 PROCEDURE — 36415 COLL VENOUS BLD VENIPUNCTURE: CPT

## 2020-03-11 ENCOUNTER — APPOINTMENT (OUTPATIENT)
Dept: GENERAL RADIOLOGY | Facility: HOSPITAL | Age: 50
DRG: 457 | End: 2020-03-11
Attending: ORTHOPAEDIC SURGERY
Payer: COMMERCIAL

## 2020-03-11 ENCOUNTER — HOSPITAL ENCOUNTER (INPATIENT)
Facility: HOSPITAL | Age: 50
LOS: 3 days | Discharge: HOME HEALTH | DRG: 457 | End: 2020-03-14
Attending: ORTHOPAEDIC SURGERY | Admitting: ORTHOPAEDIC SURGERY
Payer: COMMERCIAL

## 2020-03-11 ENCOUNTER — ANESTHESIA EVENT (OUTPATIENT)
Dept: SURGERY | Facility: HOSPITAL | Age: 50
DRG: 457 | End: 2020-03-11
Payer: COMMERCIAL

## 2020-03-11 ENCOUNTER — ANESTHESIA (OUTPATIENT)
Dept: SURGERY | Facility: HOSPITAL | Age: 50
DRG: 457 | End: 2020-03-11
Payer: COMMERCIAL

## 2020-03-11 DIAGNOSIS — M41.9 ACQUIRED SCOLIOSIS: ICD-10-CM

## 2020-03-11 DIAGNOSIS — Z01.818 PREOP TESTING: Primary | ICD-10-CM

## 2020-03-11 DIAGNOSIS — M48.061 LUMBAR FORAMINAL STENOSIS: ICD-10-CM

## 2020-03-11 LAB
B-HCG UR QL: NEGATIVE
DEPRECATED RDW RBC AUTO: 45 FL (ref 35.1–46.3)
ERYTHROCYTE [DISTWIDTH] IN BLOOD BY AUTOMATED COUNT: 13.2 % (ref 11–15)
HCT VFR BLD AUTO: 50.7 % (ref 35–48)
HGB BLD-MCNC: 16.4 G/DL (ref 12–16)
MCH RBC QN AUTO: 29.8 PG (ref 26–34)
MCHC RBC AUTO-ENTMCNC: 32.3 G/DL (ref 31–37)
MCV RBC AUTO: 92.2 FL (ref 80–100)
PLATELET # BLD AUTO: 371 10(3)UL (ref 150–450)
RBC # BLD AUTO: 5.5 X10(6)UL (ref 3.8–5.3)
WBC # BLD AUTO: 12.4 X10(3) UL (ref 4–11)

## 2020-03-11 PROCEDURE — 94660 CPAP INITIATION&MGMT: CPT

## 2020-03-11 PROCEDURE — 0SB20ZZ EXCISION OF LUMBAR VERTEBRAL DISC, OPEN APPROACH: ICD-10-PCS | Performed by: ORTHOPAEDIC SURGERY

## 2020-03-11 PROCEDURE — 36415 COLL VENOUS BLD VENIPUNCTURE: CPT | Performed by: ORTHOPAEDIC SURGERY

## 2020-03-11 PROCEDURE — 0SG10A0 FUSION OF 2 OR MORE LUMBAR VERTEBRAL JOINTS WITH INTERBODY FUSION DEVICE, ANTERIOR APPROACH, ANTERIOR COLUMN, OPEN APPROACH: ICD-10-PCS | Performed by: ORTHOPAEDIC SURGERY

## 2020-03-11 PROCEDURE — 76000 FLUOROSCOPY <1 HR PHYS/QHP: CPT | Performed by: ORTHOPAEDIC SURGERY

## 2020-03-11 PROCEDURE — 95938 SOMATOSENSORY TESTING: CPT | Performed by: ORTHOPAEDIC SURGERY

## 2020-03-11 PROCEDURE — 4A11X4G MONITORING OF PERIPHERAL NERVOUS ELECTRICAL ACTIVITY, INTRAOPERATIVE, EXTERNAL APPROACH: ICD-10-PCS | Performed by: ORTHOPAEDIC SURGERY

## 2020-03-11 PROCEDURE — 85027 COMPLETE CBC AUTOMATED: CPT | Performed by: ORTHOPAEDIC SURGERY

## 2020-03-11 PROCEDURE — 95860 NEEDLE EMG 1 EXTREMITY: CPT | Performed by: ORTHOPAEDIC SURGERY

## 2020-03-11 PROCEDURE — 81025 URINE PREGNANCY TEST: CPT

## 2020-03-11 DEVICE — IMPLANTABLE DEVICE: Type: IMPLANTABLE DEVICE | Status: FUNCTIONAL

## 2020-03-11 DEVICE — GRAFT BN CANC 30ML FRZDR CRSH: Type: IMPLANTABLE DEVICE | Status: FUNCTIONAL

## 2020-03-11 DEVICE — BONE GRAFT KIT 7510600 INFUSE LARGE
Type: IMPLANTABLE DEVICE | Status: FUNCTIONAL
Brand: INFUSE® BONE GRAFT

## 2020-03-11 RX ORDER — DOCUSATE SODIUM 100 MG/1
100 CAPSULE, LIQUID FILLED ORAL 2 TIMES DAILY
Status: DISCONTINUED | OUTPATIENT
Start: 2020-03-11 | End: 2020-03-14

## 2020-03-11 RX ORDER — LIDOCAINE HYDROCHLORIDE 10 MG/ML
INJECTION, SOLUTION EPIDURAL; INFILTRATION; INTRACAUDAL; PERINEURAL AS NEEDED
Status: DISCONTINUED | OUTPATIENT
Start: 2020-03-11 | End: 2020-03-11 | Stop reason: SURG

## 2020-03-11 RX ORDER — KETOROLAC TROMETHAMINE 30 MG/ML
30 INJECTION, SOLUTION INTRAMUSCULAR; INTRAVENOUS EVERY 6 HOURS
Status: DISPENSED | OUTPATIENT
Start: 2020-03-11 | End: 2020-03-13

## 2020-03-11 RX ORDER — HYDROCODONE BITARTRATE AND ACETAMINOPHEN 5; 325 MG/1; MG/1
2 TABLET ORAL AS NEEDED
Status: DISCONTINUED | OUTPATIENT
Start: 2020-03-11 | End: 2020-03-11 | Stop reason: HOSPADM

## 2020-03-11 RX ORDER — LIDOCAINE HYDROCHLORIDE AND EPINEPHRINE 10; 10 MG/ML; UG/ML
INJECTION, SOLUTION INFILTRATION; PERINEURAL AS NEEDED
Status: DISCONTINUED | OUTPATIENT
Start: 2020-03-11 | End: 2020-03-11 | Stop reason: HOSPADM

## 2020-03-11 RX ORDER — EPHEDRINE SULFATE 50 MG/ML
INJECTION, SOLUTION INTRAVENOUS AS NEEDED
Status: DISCONTINUED | OUTPATIENT
Start: 2020-03-11 | End: 2020-03-11 | Stop reason: SURG

## 2020-03-11 RX ORDER — SODIUM CHLORIDE, SODIUM LACTATE, POTASSIUM CHLORIDE, CALCIUM CHLORIDE 600; 310; 30; 20 MG/100ML; MG/100ML; MG/100ML; MG/100ML
INJECTION, SOLUTION INTRAVENOUS CONTINUOUS
Status: DISCONTINUED | OUTPATIENT
Start: 2020-03-11 | End: 2020-03-14

## 2020-03-11 RX ORDER — ONDANSETRON 2 MG/ML
4 INJECTION INTRAMUSCULAR; INTRAVENOUS ONCE AS NEEDED
Status: DISCONTINUED | OUTPATIENT
Start: 2020-03-11 | End: 2020-03-11 | Stop reason: HOSPADM

## 2020-03-11 RX ORDER — ALBUTEROL SULFATE 90 UG/1
1 AEROSOL, METERED RESPIRATORY (INHALATION) EVERY 6 HOURS PRN
Status: DISCONTINUED | OUTPATIENT
Start: 2020-03-11 | End: 2020-03-14

## 2020-03-11 RX ORDER — DEXAMETHASONE SODIUM PHOSPHATE 10 MG/ML
INJECTION, SOLUTION INTRAMUSCULAR; INTRAVENOUS AS NEEDED
Status: DISCONTINUED | OUTPATIENT
Start: 2020-03-11 | End: 2020-03-11 | Stop reason: HOSPADM

## 2020-03-11 RX ORDER — DEXAMETHASONE SODIUM PHOSPHATE 4 MG/ML
VIAL (ML) INJECTION AS NEEDED
Status: DISCONTINUED | OUTPATIENT
Start: 2020-03-11 | End: 2020-03-11 | Stop reason: SURG

## 2020-03-11 RX ORDER — ONDANSETRON 2 MG/ML
4 INJECTION INTRAMUSCULAR; INTRAVENOUS EVERY 4 HOURS PRN
Status: ACTIVE | OUTPATIENT
Start: 2020-03-11 | End: 2020-03-12

## 2020-03-11 RX ORDER — HYDROMORPHONE HYDROCHLORIDE 1 MG/ML
0.8 INJECTION, SOLUTION INTRAMUSCULAR; INTRAVENOUS; SUBCUTANEOUS EVERY 2 HOUR PRN
Status: DISCONTINUED | OUTPATIENT
Start: 2020-03-11 | End: 2020-03-14

## 2020-03-11 RX ORDER — DEXAMETHASONE SODIUM PHOSPHATE 10 MG/ML
10 INJECTION, SOLUTION INTRAMUSCULAR; INTRAVENOUS EVERY 12 HOURS
Status: COMPLETED | OUTPATIENT
Start: 2020-03-11 | End: 2020-03-12

## 2020-03-11 RX ORDER — HYDROMORPHONE HYDROCHLORIDE 1 MG/ML
0.2 INJECTION, SOLUTION INTRAMUSCULAR; INTRAVENOUS; SUBCUTANEOUS EVERY 5 MIN PRN
Status: DISCONTINUED | OUTPATIENT
Start: 2020-03-11 | End: 2020-03-11 | Stop reason: HOSPADM

## 2020-03-11 RX ORDER — CEFAZOLIN SODIUM/WATER 2 G/20 ML
2 SYRINGE (ML) INTRAVENOUS EVERY 8 HOURS
Status: COMPLETED | OUTPATIENT
Start: 2020-03-11 | End: 2020-03-12

## 2020-03-11 RX ORDER — MONTELUKAST SODIUM 10 MG/1
10 TABLET ORAL NIGHTLY
Status: DISCONTINUED | OUTPATIENT
Start: 2020-03-11 | End: 2020-03-14

## 2020-03-11 RX ORDER — NALOXONE HYDROCHLORIDE 0.4 MG/ML
80 INJECTION, SOLUTION INTRAMUSCULAR; INTRAVENOUS; SUBCUTANEOUS AS NEEDED
Status: DISCONTINUED | OUTPATIENT
Start: 2020-03-11 | End: 2020-03-11 | Stop reason: HOSPADM

## 2020-03-11 RX ORDER — PROCHLORPERAZINE EDISYLATE 5 MG/ML
5 INJECTION INTRAMUSCULAR; INTRAVENOUS ONCE AS NEEDED
Status: DISCONTINUED | OUTPATIENT
Start: 2020-03-11 | End: 2020-03-11 | Stop reason: HOSPADM

## 2020-03-11 RX ORDER — DIPHENHYDRAMINE HYDROCHLORIDE 50 MG/ML
25 INJECTION INTRAMUSCULAR; INTRAVENOUS EVERY 4 HOURS PRN
Status: DISCONTINUED | OUTPATIENT
Start: 2020-03-11 | End: 2020-03-14

## 2020-03-11 RX ORDER — MIDAZOLAM HYDROCHLORIDE 1 MG/ML
INJECTION INTRAMUSCULAR; INTRAVENOUS AS NEEDED
Status: DISCONTINUED | OUTPATIENT
Start: 2020-03-11 | End: 2020-03-11 | Stop reason: SURG

## 2020-03-11 RX ORDER — SODIUM PHOSPHATE, DIBASIC AND SODIUM PHOSPHATE, MONOBASIC 7; 19 G/133ML; G/133ML
1 ENEMA RECTAL ONCE AS NEEDED
Status: DISCONTINUED | OUTPATIENT
Start: 2020-03-11 | End: 2020-03-14

## 2020-03-11 RX ORDER — HYDROMORPHONE HYDROCHLORIDE 1 MG/ML
0.6 INJECTION, SOLUTION INTRAMUSCULAR; INTRAVENOUS; SUBCUTANEOUS EVERY 5 MIN PRN
Status: DISCONTINUED | OUTPATIENT
Start: 2020-03-11 | End: 2020-03-11 | Stop reason: HOSPADM

## 2020-03-11 RX ORDER — HYDROMORPHONE HYDROCHLORIDE 1 MG/ML
0.4 INJECTION, SOLUTION INTRAMUSCULAR; INTRAVENOUS; SUBCUTANEOUS EVERY 5 MIN PRN
Status: DISCONTINUED | OUTPATIENT
Start: 2020-03-11 | End: 2020-03-11 | Stop reason: HOSPADM

## 2020-03-11 RX ORDER — ROCURONIUM BROMIDE 10 MG/ML
INJECTION, SOLUTION INTRAVENOUS AS NEEDED
Status: DISCONTINUED | OUTPATIENT
Start: 2020-03-11 | End: 2020-03-11 | Stop reason: SURG

## 2020-03-11 RX ORDER — HYDROMORPHONE HYDROCHLORIDE 1 MG/ML
0.4 INJECTION, SOLUTION INTRAMUSCULAR; INTRAVENOUS; SUBCUTANEOUS EVERY 2 HOUR PRN
Status: DISCONTINUED | OUTPATIENT
Start: 2020-03-11 | End: 2020-03-14

## 2020-03-11 RX ORDER — ONDANSETRON 2 MG/ML
INJECTION INTRAMUSCULAR; INTRAVENOUS AS NEEDED
Status: DISCONTINUED | OUTPATIENT
Start: 2020-03-11 | End: 2020-03-11 | Stop reason: SURG

## 2020-03-11 RX ORDER — METOCLOPRAMIDE HYDROCHLORIDE 5 MG/ML
10 INJECTION INTRAMUSCULAR; INTRAVENOUS EVERY 6 HOURS PRN
Status: DISCONTINUED | OUTPATIENT
Start: 2020-03-11 | End: 2020-03-14

## 2020-03-11 RX ORDER — ATORVASTATIN CALCIUM 10 MG/1
10 TABLET, FILM COATED ORAL NIGHTLY
Status: DISCONTINUED | OUTPATIENT
Start: 2020-03-11 | End: 2020-03-14

## 2020-03-11 RX ORDER — HYDROMORPHONE HYDROCHLORIDE 1 MG/ML
0.2 INJECTION, SOLUTION INTRAMUSCULAR; INTRAVENOUS; SUBCUTANEOUS EVERY 2 HOUR PRN
Status: DISCONTINUED | OUTPATIENT
Start: 2020-03-11 | End: 2020-03-14

## 2020-03-11 RX ORDER — TIZANIDINE 4 MG/1
4 TABLET ORAL ONCE
Status: COMPLETED | OUTPATIENT
Start: 2020-03-11 | End: 2020-03-11

## 2020-03-11 RX ORDER — SENNOSIDES 8.6 MG
17.2 TABLET ORAL NIGHTLY
Status: DISCONTINUED | OUTPATIENT
Start: 2020-03-11 | End: 2020-03-14

## 2020-03-11 RX ORDER — MORPHINE SULFATE 4 MG/ML
4 INJECTION, SOLUTION INTRAMUSCULAR; INTRAVENOUS EVERY 10 MIN PRN
Status: DISCONTINUED | OUTPATIENT
Start: 2020-03-11 | End: 2020-03-11 | Stop reason: HOSPADM

## 2020-03-11 RX ORDER — CETIRIZINE HYDROCHLORIDE 10 MG/1
10 TABLET ORAL EVERY MORNING
Status: DISCONTINUED | OUTPATIENT
Start: 2020-03-12 | End: 2020-03-14

## 2020-03-11 RX ORDER — MORPHINE SULFATE 4 MG/ML
2 INJECTION, SOLUTION INTRAMUSCULAR; INTRAVENOUS EVERY 10 MIN PRN
Status: DISCONTINUED | OUTPATIENT
Start: 2020-03-11 | End: 2020-03-11 | Stop reason: HOSPADM

## 2020-03-11 RX ORDER — BUPROPION HYDROCHLORIDE 150 MG/1
150 TABLET ORAL EVERY MORNING
Status: DISCONTINUED | OUTPATIENT
Start: 2020-03-12 | End: 2020-03-14

## 2020-03-11 RX ORDER — MORPHINE SULFATE 10 MG/ML
6 INJECTION, SOLUTION INTRAMUSCULAR; INTRAVENOUS EVERY 10 MIN PRN
Status: DISCONTINUED | OUTPATIENT
Start: 2020-03-11 | End: 2020-03-11 | Stop reason: HOSPADM

## 2020-03-11 RX ORDER — DIPHENHYDRAMINE HCL 25 MG
25 CAPSULE ORAL EVERY 4 HOURS PRN
Status: DISCONTINUED | OUTPATIENT
Start: 2020-03-11 | End: 2020-03-14

## 2020-03-11 RX ORDER — SODIUM CHLORIDE 9 MG/ML
INJECTION, SOLUTION INTRAVENOUS CONTINUOUS PRN
Status: DISCONTINUED | OUTPATIENT
Start: 2020-03-11 | End: 2020-03-11 | Stop reason: SURG

## 2020-03-11 RX ORDER — ACETAMINOPHEN 500 MG
1000 TABLET ORAL ONCE
Status: COMPLETED | OUTPATIENT
Start: 2020-03-11 | End: 2020-03-11

## 2020-03-11 RX ORDER — SODIUM CHLORIDE, SODIUM LACTATE, POTASSIUM CHLORIDE, CALCIUM CHLORIDE 600; 310; 30; 20 MG/100ML; MG/100ML; MG/100ML; MG/100ML
INJECTION, SOLUTION INTRAVENOUS CONTINUOUS
Status: DISCONTINUED | OUTPATIENT
Start: 2020-03-11 | End: 2020-03-11 | Stop reason: HOSPADM

## 2020-03-11 RX ORDER — BUPIVACAINE HYDROCHLORIDE 5 MG/ML
INJECTION, SOLUTION EPIDURAL; INTRACAUDAL AS NEEDED
Status: DISCONTINUED | OUTPATIENT
Start: 2020-03-11 | End: 2020-03-11 | Stop reason: HOSPADM

## 2020-03-11 RX ORDER — ESCITALOPRAM OXALATE 20 MG/1
20 TABLET ORAL EVERY MORNING
Status: DISCONTINUED | OUTPATIENT
Start: 2020-03-12 | End: 2020-03-14

## 2020-03-11 RX ORDER — GABAPENTIN 300 MG/1
900 CAPSULE ORAL ONCE AS NEEDED
Status: COMPLETED | OUTPATIENT
Start: 2020-03-11 | End: 2020-03-11

## 2020-03-11 RX ORDER — DEXTROAMPHETAMINE SACCHARATE, AMPHETAMINE ASPARTATE, DEXTROAMPHETAMINE SULFATE AND AMPHETAMINE SULFATE 5; 5; 5; 5 MG/1; MG/1; MG/1; MG/1
20 TABLET ORAL EVERY MORNING
Status: DISCONTINUED | OUTPATIENT
Start: 2020-03-12 | End: 2020-03-11

## 2020-03-11 RX ORDER — GLYCOPYRROLATE 0.2 MG/ML
INJECTION, SOLUTION INTRAMUSCULAR; INTRAVENOUS AS NEEDED
Status: DISCONTINUED | OUTPATIENT
Start: 2020-03-11 | End: 2020-03-11 | Stop reason: SURG

## 2020-03-11 RX ORDER — FAMOTIDINE 20 MG/1
20 TABLET ORAL ONCE
Status: COMPLETED | OUTPATIENT
Start: 2020-03-11 | End: 2020-03-11

## 2020-03-11 RX ORDER — OXYCODONE HYDROCHLORIDE AND ACETAMINOPHEN 5; 325 MG/1; MG/1
1-2 TABLET ORAL EVERY 4 HOURS PRN
Status: DISCONTINUED | OUTPATIENT
Start: 2020-03-11 | End: 2020-03-14

## 2020-03-11 RX ORDER — SCOLOPAMINE TRANSDERMAL SYSTEM 1 MG/1
1 PATCH, EXTENDED RELEASE TRANSDERMAL ONCE
Status: DISCONTINUED | OUTPATIENT
Start: 2020-03-11 | End: 2020-03-11

## 2020-03-11 RX ORDER — POLYETHYLENE GLYCOL 3350 17 G/17G
17 POWDER, FOR SOLUTION ORAL DAILY PRN
Status: DISCONTINUED | OUTPATIENT
Start: 2020-03-11 | End: 2020-03-14

## 2020-03-11 RX ORDER — BISACODYL 10 MG
10 SUPPOSITORY, RECTAL RECTAL
Status: DISCONTINUED | OUTPATIENT
Start: 2020-03-11 | End: 2020-03-14

## 2020-03-11 RX ORDER — TOPIRAMATE 25 MG/1
50 TABLET ORAL EVERY MORNING
Status: DISCONTINUED | OUTPATIENT
Start: 2020-03-12 | End: 2020-03-14

## 2020-03-11 RX ORDER — HYDROCODONE BITARTRATE AND ACETAMINOPHEN 5; 325 MG/1; MG/1
1 TABLET ORAL AS NEEDED
Status: DISCONTINUED | OUTPATIENT
Start: 2020-03-11 | End: 2020-03-11 | Stop reason: HOSPADM

## 2020-03-11 RX ADMIN — MIDAZOLAM HYDROCHLORIDE 2 MG: 1 INJECTION INTRAMUSCULAR; INTRAVENOUS at 14:39:00

## 2020-03-11 RX ADMIN — SODIUM CHLORIDE: 9 INJECTION, SOLUTION INTRAVENOUS at 14:50:00

## 2020-03-11 RX ADMIN — DEXAMETHASONE SODIUM PHOSPHATE 6 MG: 4 MG/ML VIAL (ML) INJECTION at 16:35:00

## 2020-03-11 RX ADMIN — SODIUM CHLORIDE, SODIUM LACTATE, POTASSIUM CHLORIDE, CALCIUM CHLORIDE: 600; 310; 30; 20 INJECTION, SOLUTION INTRAVENOUS at 14:34:00

## 2020-03-11 RX ADMIN — ONDANSETRON 4 MG: 2 INJECTION INTRAMUSCULAR; INTRAVENOUS at 16:37:00

## 2020-03-11 RX ADMIN — SODIUM CHLORIDE, SODIUM LACTATE, POTASSIUM CHLORIDE, CALCIUM CHLORIDE: 600; 310; 30; 20 INJECTION, SOLUTION INTRAVENOUS at 16:26:00

## 2020-03-11 RX ADMIN — DEXAMETHASONE SODIUM PHOSPHATE 4 MG: 4 MG/ML VIAL (ML) INJECTION at 14:51:00

## 2020-03-11 RX ADMIN — EPHEDRINE SULFATE 5 MG: 50 INJECTION, SOLUTION INTRAVENOUS at 15:06:00

## 2020-03-11 RX ADMIN — GLYCOPYRROLATE 0.1 MG: 0.2 INJECTION, SOLUTION INTRAMUSCULAR; INTRAVENOUS at 14:39:00

## 2020-03-11 RX ADMIN — EPHEDRINE SULFATE 10 MG: 50 INJECTION, SOLUTION INTRAVENOUS at 15:13:00

## 2020-03-11 RX ADMIN — ROCURONIUM BROMIDE 5 MG: 10 INJECTION, SOLUTION INTRAVENOUS at 14:39:00

## 2020-03-11 RX ADMIN — LIDOCAINE HYDROCHLORIDE 50 MG: 10 INJECTION, SOLUTION EPIDURAL; INFILTRATION; INTRACAUDAL; PERINEURAL at 14:39:00

## 2020-03-11 NOTE — ANESTHESIA PROCEDURE NOTES
Airway  Urgency: Elective      General Information and Staff    Patient location during procedure: OR  Anesthesiologist: Julien Rich MD  Resident/CRNA: Jessica Martin CRNA  Performed: CRNA     Indications and Patient Condition  Indications for airway

## 2020-03-11 NOTE — ANESTHESIA POSTPROCEDURE EVALUATION
Patient: Cindy Abreu    Procedure Summary     Date:  03/11/20 Room / Location:  [facility] OR  / [facility] OR    Anesthesia Start:  0966 Anesthesia Stop:  8996    Procedure:  FAR LATERAL LUMBAR INTERBODY FUSION 2 LEVEL (N/A ) Diagnosis:       Lumbar

## 2020-03-11 NOTE — ANESTHESIA PROCEDURE NOTES
Peripheral IV  Date/Time: 3/11/2020 2:35 PM  Inserted by: Sofy Freed MD    Placement  Needle size: 18 G  Laterality: right  Location: wrist  Local anesthetic: none  Site prep: alcohol  Technique: anatomical landmarks  Attempts: 1

## 2020-03-11 NOTE — BRIEF OP NOTE
Pre-Operative Diagnosis: Lumbar foraminal stenosis [M48.061]  Acquired scoliosis [M41.9]     Post-Operative Diagnosis: Lumbar foraminal stenosis [M48.061]Acquired scoliosis [M41.9]      Procedure Performed:   Procedure(s):  lumbar 3 - lumbar 5 extreme late

## 2020-03-11 NOTE — ANESTHESIA PREPROCEDURE EVALUATION
Anesthesia PreOp Note    HPI:     Elena Menjivar is a 54year old female who presents for preoperative consultation requested by: Emma Daily MD    Date of Surgery: 3/11/2020    Procedure(s):  FAR LATERAL LUMBAR INTERBODY FUSION 2 LEVEL  Indication: problem    • Depression    • Esophageal reflux    • High cholesterol    • Hypercholesterolemia    • Osteoarthritis    • Sleep apnea     Uses CPAP   • Visual impairment     Glasses       Past Surgical History:   Procedure Laterality Date   • ADJUSTMENT IDA 3/10/2020 at 0800  amphetamine-dextroamphetamine 20 MG Oral Tab, Take by mouth every morning.  , Disp: , Rfl: , 3/10/2020 at 0700  ADVAIR DISKUS 250-50 MCG/DOSE Inhalation Aerosol Powder, Breath Activated, 1 puff as needed.   , Disp: , Rfl: , 3/10/2020 at 0 Not on file      Food insecurity:        Worry: Not on file        Inability: Not on file      Transportation needs:        Medical: Not on file        Non-medical: Not on file    Tobacco Use      Smoking status: Former Smoker        Packs/day: 1.00 history of anesthetic complications   Airway   Mallampati: II  Neck ROM: full  Dental - normal exam     Pulmonary - negative ROS and normal exam   (+) asthma, sleep apnea,   Cardiovascular - negative ROS and normal exam    Neuro/Psych - negative ROS   (+)

## 2020-03-11 NOTE — H&P
HISTORY OF PRESENT ILLNESS:  This is a 51-year-old lady presenting for second opinion regarding potential treatment options for severe lower back pain radiating to the right buttock and into the right thigh. She has to use a cane. She is only 57years old. 4/11/2018     Performed by Kobe Souza MD at [address] [place] [address]   • KNEE SURGERY Bilateral 02/14/2018     Dr. John Paul Anderson - TKA's    • KNEE SURGERY Bilateral 02/15/2018     Dr. Luna Moser - I & D with Wound Closure   • KNEE SURGERY Left 04/11/2018     Dr. John Paul Anderson - Alcohol use:  Yes        Alcohol/week: 0.0 standard drinks        Comment: socially      Drug use: No     CURRENT MEDICATIONS AND ALLERGIES:      Current Outpatient Medications:   •  Acetaminophen-Codeine #3 300-30 MG Oral Tab, Take 1 tablet by mouth 2 (two SYSTEMS:      General:   []? Unexplained weight loss  []? Appetite Change   []? Fever or chills  []? Night sweats  []? Marked fatigue  []? Difficulty Sleeping      Cardiovascular:  []? Heart or chest pain  []? Abnormal heartbeat  []?  Poor heart function    fusion. ASSESSMENT AND PLAN:  Status post L2-L3 fusion  Foraminal stenosis L3-L4, L4-L5  Degenerative scoliosis    Plan L3-L4, L4-L5 interbody fusion via lateral transpsoas approach.  Risks, alternatives, benefits of surgery discussed with patient and s

## 2020-03-12 LAB
ANION GAP SERPL CALC-SCNC: 8 MMOL/L (ref 0–18)
BUN BLD-MCNC: 9 MG/DL (ref 7–18)
BUN/CREAT SERPL: 9.6 (ref 10–20)
CALCIUM BLD-MCNC: 8.6 MG/DL (ref 8.5–10.1)
CHLORIDE SERPL-SCNC: 110 MMOL/L (ref 98–112)
CO2 SERPL-SCNC: 21 MMOL/L (ref 21–32)
CREAT BLD-MCNC: 0.94 MG/DL (ref 0.55–1.02)
DEPRECATED RDW RBC AUTO: 42.5 FL (ref 35.1–46.3)
ERYTHROCYTE [DISTWIDTH] IN BLOOD BY AUTOMATED COUNT: 12.8 % (ref 11–15)
EST. AVERAGE GLUCOSE BLD GHB EST-MCNC: 103 MG/DL (ref 68–126)
GLUCOSE BLD-MCNC: 213 MG/DL (ref 70–99)
HBA1C MFR BLD HPLC: 5.2 % (ref ?–5.7)
HCT VFR BLD AUTO: 44.7 % (ref 35–48)
HGB BLD-MCNC: 14.7 G/DL (ref 12–16)
MCH RBC QN AUTO: 29.5 PG (ref 26–34)
MCHC RBC AUTO-ENTMCNC: 32.9 G/DL (ref 31–37)
MCV RBC AUTO: 89.8 FL (ref 80–100)
OSMOLALITY SERPL CALC.SUM OF ELEC: 293 MOSM/KG (ref 275–295)
PLATELET # BLD AUTO: 377 10(3)UL (ref 150–450)
POTASSIUM SERPL-SCNC: 4.2 MMOL/L (ref 3.5–5.1)
RBC # BLD AUTO: 4.98 X10(6)UL (ref 3.8–5.3)
SODIUM SERPL-SCNC: 139 MMOL/L (ref 136–145)
WBC # BLD AUTO: 15.6 X10(3) UL (ref 4–11)

## 2020-03-12 PROCEDURE — 94640 AIRWAY INHALATION TREATMENT: CPT

## 2020-03-12 PROCEDURE — 80048 BASIC METABOLIC PNL TOTAL CA: CPT | Performed by: ORTHOPAEDIC SURGERY

## 2020-03-12 PROCEDURE — 85027 COMPLETE CBC AUTOMATED: CPT | Performed by: ORTHOPAEDIC SURGERY

## 2020-03-12 PROCEDURE — 83036 HEMOGLOBIN GLYCOSYLATED A1C: CPT | Performed by: HOSPITALIST

## 2020-03-12 PROCEDURE — 97166 OT EVAL MOD COMPLEX 45 MIN: CPT

## 2020-03-12 PROCEDURE — 97161 PT EVAL LOW COMPLEX 20 MIN: CPT

## 2020-03-12 PROCEDURE — 97535 SELF CARE MNGMENT TRAINING: CPT

## 2020-03-12 PROCEDURE — 97116 GAIT TRAINING THERAPY: CPT

## 2020-03-12 NOTE — H&P
[facility] Hospitalist H&P       CC: Back pain     PCP: Deena Croft DO    History of Present Illness: Patient is a 54year old female with PMH sig for Asthma, depression, anxiety, arrhythmia, BRENEDN on CPAP, HLD, anxiety, who presents for lumbar fusion.   Sofy SURGICAL HISTORY     • QUADRICEPS RUPTURE REPAIR Left 4/11/2018    Performed by Felisa Melendez MD at [facility] MAIN OR   • SPINE SURGERY PROCEDURE UNLISTED     • TOTAL KNEE REPLACEMENT          ALL:  No Known Allergies     Home Medications:  Montelukast Sodium reviewed and negative except for what's stated above.      OBJECTIVE:  /59 (BP Location: Right arm)   Pulse 78   Temp 98.2 °F (36.8 °C) (Oral)   Resp 18   Ht 5' 3\" (1.6 m)   Wt 256 lb (116.1 kg)   SpO2 98%   BMI 45.35 kg/m²   General:  Alert, no dist Prophy:scd  Atrophy: IS  Lines: PIV    Dispo: pending clinical course    Outpatient records or previous hospital records reviewed. Further recommendations pending patient's clinical course.   [facility] hospitalist to continue to follow patient while in house

## 2020-03-12 NOTE — PROGRESS NOTES
S: ambulated with PT to bathroom with walker, though right lower extremity giving way.  Complains of pain and numbness in right thigh    O:    03/12/20  0900   BP: 106/59   Pulse: 71   Resp: 18   Temp: 98.6 °F (37 °C)     Left lower extremity sensation to l

## 2020-03-12 NOTE — PLAN OF CARE
Problem: Patient/Family Goals  Goal: Patient/Family Long Term Goal  Description  Patient's Long Term Goal:     Interventions:  -   - See additional Care Plan goals for specific interventions  Outcome: Progressing  Goal: Patient/Family Short Term Goal  Robyn Blas falls.  - [place] fall precautions as indicated by assessment.  - Educate pt/family on patient safety including physical limitations  - Instruct pt to call for assistance with activity based on assessment  - Modify environment to reduce risk of injury  - patient stability and activity tolerance for standing, transferring and ambulating w/ or w/o assistive devices  - Assist with transfers and ambulation using safe patient handling equipment as needed  - Ensure adequate protection for wounds/incisions during

## 2020-03-12 NOTE — PHYSICAL THERAPY NOTE
PT PM session: pt received in chair for therex BLE marching, LAQ, ankle pumps x 10 reps.  RLE hinge brace has been ordered per RN will not arrive until 3/12 at earliest. Pt and family education with transfers with mod A to stand from chair and min A to amb

## 2020-03-12 NOTE — PHYSICAL THERAPY NOTE
PHYSICAL THERAPY EVALUATION - INPATIENT     Room Number: 425/425-A  Evaluation Date: 3/12/2020  Type of Evaluation: Initial   Physician Order: PT Eval and Treat    Presenting Problem: Lumbar IB fusion  Reason for Therapy: Mobility Dysfunction and Discharg training;Stoop training;Patient education  Rehab Potential : Good  Frequency (Obs): BID       PHYSICAL THERAPY MEDICAL/SOCIAL HISTORY     History related to current admission: Acquired Scoliosis, chronic low back pain weakness     Problem List  Active Prob Performed by Chance Avila MD at [facility] MAIN OR   • SPINE SURGERY PROCEDURE UNLISTED     • TOTAL KNEE REPLACEMENT         HOME SITUATION  Type of Home: House   Home Layout: Two level  Stairs to Enter :  7(front door only 3 steps but no rail back stair B rail back to sitting on the side of the bed?: A Little   How much help from another person does the patient currently need. ..   -   Moving to and from a bed to a chair (including a wheelchair)?: A Little   -   Need to walk in hospital room?: A [address] Goal #5 Patient to demonstrate independence with home activity/exercise instructions provided to patient in preparation for discharge.    Goal #5   Current Status Ongoing   Goal #6    Goal #6  Current Status

## 2020-03-12 NOTE — OCCUPATIONAL THERAPY NOTE
OCCUPATIONAL THERAPY EVALUATION - INPATIENT      Room Number: 425/425-A  Evaluation Date: 3/12/2020  Type of Evaluation: Initial  Presenting Problem: (s/p L3-L5 lateral fusion )    Physician Order: IP Consult to Occupational Therapy  Reason for Therapy: AD conservation/work simplification techniques;ADL training;Functional transfer training;UE strengthening/ROM; Endurance training;Patient/Family education;Patient/Family training;Equipment eval/education; Compensatory technique education       OCCUPATIONAL THER INJECTION Left 4/24/2015    Performed by Bebo Barr DO at [facility]   • OTHER SURGICAL HISTORY     • QUADRICEPS RUPTURE REPAIR Left 4/11/2018    Performed by Graciela Virk MD at [facility] OR   • [address] [facility]     • Taking care of personal grooming such as brushing teeth?: None  -   Eating meals?: None    AM-PAC Score:  Score: 19  Approx Degree of Impairment: 42.8%  Standardized Score (AM-PAC Scale): 40.22  CMS Modifier (G-Code): CK    FUNCTIONAL TRANSFER ASSESSMENT

## 2020-03-12 NOTE — PLAN OF CARE
Problem: PAIN - ADULT  Goal: Verbalizes/displays adequate comfort level or patient's stated pain goal  Description  INTERVENTIONS:  - Encourage pt to monitor pain and request assistance  - Assess pain using appropriate pain scale  - Administer analgesics patient's ability to be responsible for managing their own health  - Refer to Case Management Department for coordinating discharge planning if the patient needs post-hospital services based on physician/LIP order or complex needs related to functional sta w/o assistive devices  - Assist with transfers and ambulation using safe patient handling equipment as needed  - Ensure adequate protection for wounds/incisions during mobilization  - Obtain PT/OT consults as needed  - Advance activity as appropriate  - Co

## 2020-03-12 NOTE — PROGRESS NOTES
[facility] Hospitalist Progress Note     CC: Hospital Follow up    PCP: Ashley Larios DO       Assessment/Plan:     Active Problems:    Acquired scoliosis    Patient is a 54year old female with PMH sig for Asthma, depression, anxiety, arrhythmia, BRENDEN on CPAP, H deficits  Heent: NC AT,    Pulm: Lungs clear, normal respiratory effort  CV: Heart with regular rate and rhythm, no peripheral edema  Abd: Abdomen soft, nontender, nondistended, no organomegaly, bowel sounds present  MSK: Full range of motion in extremitie Albuterol Sulfate HFA, PEG 3350, magnesium hydroxide, bisacodyl, Fleet Enema, ondansetron HCl, Metoclopramide HCl, diphenhydrAMINE **OR** diphenhydrAMINE HCl, HYDROmorphone HCl **OR** HYDROmorphone HCl **OR** HYDROmorphone HCl

## 2020-03-12 NOTE — CM/SW NOTE
ETIENNE received MDO for discharge planning. ETIENNE met with pt to complete an initial assessment. SW confirmed pt's address and phone number with the pt. Pt lives in a 2 level  house with their siblings.  There is/are 7 steps into the home and 2 steps to the bedroo

## 2020-03-12 NOTE — OPERATIVE REPORT
[facility]    PATIENT'S NAME: Jamil Patrick NAIN   ATTENDING PHYSICIAN: Peter De La Fuente. Damon Buckley MD   OPERATING PHYSICIAN: Cb Buckley MD   PATIENT ACCOUNT#:   [id number]    LOCATION:  [address] [place] [address] #:   [id number]       DATE OF BIRTH: closure. ANESTHESIA:  General endotracheal.    ESTIMATED BLOOD LOSS:  75 mL. COMPLICATIONS:  None apparent. INDICATIONS:  Patient is a 72-year-old lady who has a history of L2-L3 posterior spinal fusion.   She has developed intractable back pain an wall until the retroperitoneal space was entered. The peritoneal fat was identified and swept anteriorly, exposing the psoas muscle. We placed an EMG probe to the L4-L5 disc space. Robust responses were noted from the right thigh.   The EMG probe was rep mm in width x 8 mm in height x 50 mm in length. Wounds were irrigated with copious amounts of irrigation fluid. No significant bleeding noticed. No drain was indicated. We then began the closure.   Fascial layer closed with 0 Vicryl in interrupted f

## 2020-03-12 NOTE — OCCUPATIONAL THERAPY NOTE
Attempted to see pt for OT evaluation, however per RICHELLE London Butt to hold off at this time as pt is upset and crying at this time. Will cont to follow.

## 2020-03-13 ENCOUNTER — APPOINTMENT (OUTPATIENT)
Dept: GENERAL RADIOLOGY | Facility: HOSPITAL | Age: 50
DRG: 457 | End: 2020-03-13
Attending: ORTHOPAEDIC SURGERY
Payer: COMMERCIAL

## 2020-03-13 LAB
ANION GAP SERPL CALC-SCNC: 8 MMOL/L (ref 0–18)
BUN BLD-MCNC: 17 MG/DL (ref 7–18)
BUN/CREAT SERPL: 18.5 (ref 10–20)
CALCIUM BLD-MCNC: 9.1 MG/DL (ref 8.5–10.1)
CHLORIDE SERPL-SCNC: 109 MMOL/L (ref 98–112)
CO2 SERPL-SCNC: 23 MMOL/L (ref 21–32)
CREAT BLD-MCNC: 0.92 MG/DL (ref 0.55–1.02)
GLUCOSE BLD-MCNC: 163 MG/DL (ref 70–99)
OSMOLALITY SERPL CALC.SUM OF ELEC: 295 MOSM/KG (ref 275–295)
POTASSIUM SERPL-SCNC: 4.6 MMOL/L (ref 3.5–5.1)
SODIUM SERPL-SCNC: 140 MMOL/L (ref 136–145)

## 2020-03-13 PROCEDURE — 97116 GAIT TRAINING THERAPY: CPT

## 2020-03-13 PROCEDURE — 97530 THERAPEUTIC ACTIVITIES: CPT

## 2020-03-13 PROCEDURE — 97110 THERAPEUTIC EXERCISES: CPT

## 2020-03-13 PROCEDURE — 97535 SELF CARE MNGMENT TRAINING: CPT

## 2020-03-13 PROCEDURE — 80048 BASIC METABOLIC PNL TOTAL CA: CPT | Performed by: ORTHOPAEDIC SURGERY

## 2020-03-13 PROCEDURE — 72100 X-RAY EXAM L-S SPINE 2/3 VWS: CPT | Performed by: ORTHOPAEDIC SURGERY

## 2020-03-13 RX ORDER — POLYETHYLENE GLYCOL 3350 17 G/17G
17 POWDER, FOR SOLUTION ORAL 2 TIMES DAILY PRN
Refills: 0 | Status: SHIPPED | COMMUNITY
Start: 2020-03-13 | End: 2020-03-24

## 2020-03-13 RX ORDER — OXYCODONE HYDROCHLORIDE AND ACETAMINOPHEN 5; 325 MG/1; MG/1
1-2 TABLET ORAL EVERY 6 HOURS PRN
Qty: 55 TABLET | Refills: 0 | Status: SHIPPED | OUTPATIENT
Start: 2020-03-13 | End: 2020-03-24

## 2020-03-13 NOTE — PROGRESS NOTES
[facility] Hospitalist Progress Note     CC: Hospital Follow up    PCP: Lenore Razo DO     Subjective:     - sensation improving in rle, feels more stable w/ the brace, hoping to go home instead of rehab center    OBJECTIVE:    Blood pressure 117/55, pulse 60, Fluoroscopy C-arm Time <1 Hour  (cpt=76000)    Result Date: 3/12/2020  CONCLUSION:  1. Fluoroscopic guidance utilized during lumbar interbody fusion procedure L3-4 and L4-5. Prior fixation hardware and interbody fusion at L2-3.     Dictated by (CST): Phase Eight tomorrow - med rec completed    Wilberto Turpin [facility] Hospitalist  Pager: [phone]  Answering Service: [phone]

## 2020-03-13 NOTE — PLAN OF CARE
Problem: Patient/Family Goals  Goal: Patient/Family Long Term Goal  Description  Patient's Long Term Goal:     Interventions:  -   - See additional Care Plan goals for specific interventions  Outcome: Progressing  Goal: Patient/Family Short Term Goal  Dania Ray falls.  - [place] fall precautions as indicated by assessment.  - Educate pt/family on patient safety including physical limitations  - Instruct pt to call for assistance with activity based on assessment  - Modify environment to reduce risk of injury  - patient stability and activity tolerance for standing, transferring and ambulating w/ or w/o assistive devices  - Assist with transfers and ambulation using safe patient handling equipment as needed  - Ensure adequate protection for wounds/incisions during

## 2020-03-13 NOTE — HOME CARE LIAISON
Received referral from [address] [place], [place] [address]Donnell Spoke with patient, confirmed she is agreeable to [facility], pending orders. Residential brochure provided with contact information. All questions addressed and answered.      Patient will need a face

## 2020-03-13 NOTE — PROGRESS NOTES
S: she feels right thigh numbness is improving. She received hinged knee brace yesterday and feels she can ambulate with the brace.  She prefers to go home instead of facility if possible    O:    03/13/20  0500   BP: 118/71   Pulse: 52   Resp: 18   Temp: 9

## 2020-03-13 NOTE — PLAN OF CARE
Patient is alert and oriented x3/ Voiding freely. Up with one assist with RW and right hinge brace to RLE. Numbness noted to BLE. Dressing is clean and dry, polar ice. Saline locked. CPAP HS. Tele in place. SCD's to BLE. Denies nausea or SOB.  Call light is Anticipate increased pain with activity and pre-medicate as appropriate  Outcome: Progressing     Problem: SAFETY ADULT - FALL  Goal: Free from fall injury  Description  INTERVENTIONS:  - Assess pt frequently for physical needs  - Identify cognitive and ph surrounding tissue  - Implement wound care per orders  - Initiate isolation precautions as appropriate  - Initiate Pressure Ulcer prevention bundle as indicated  Outcome: Progressing     Problem: MUSCULOSKELETAL - ADULT  Goal: Return mobility to safest lev

## 2020-03-13 NOTE — OCCUPATIONAL THERAPY NOTE
OCCUPATIONAL THERAPY TREATMENT NOTE - INPATIENT    Room Number: 425/425-A         Presenting Problem: (s/p L3-L5 lateral fusion )     Problem List  Active Problems:    Acquired scoliosis      OCCUPATIONAL THERAPY ASSESSMENT     Pt seen up in bed, and perfo ACTIVITIES OF DAILY LIVING ASSESSMENT  AM-PAC ‘6-Clicks’ Inpatient Daily Activity Short Form  How much help from another person does the patient currently need…  -   Putting on and taking off regular lower body clothing?: A Little  -   Bathing (teddyi Comment: grooming with set up     Patient will complete toileting with modified independent.    Comment: CGA            Goals  on: 2020  Frequency: 3-5x/week

## 2020-03-13 NOTE — PHYSICAL THERAPY NOTE
PHYSICAL THERAPY TREATMENT NOTE - INPATIENT     Room Number: 425/425-A       Presenting Problem: Lumbar IB fusion    Problem List  Active Problems:    Acquired scoliosis      PHYSICAL THERAPY ASSESSMENT     Pt seen BID. Pt educated on deep breathing,relaxat Moving to and from a bed to a chair (including a wheelchair)?: A Little   -   Need to walk in hospital room?: A Little   -   Climbing 3-5 steps with a railing?: A Lot     AM-PAC Score:  Raw Score: 17   Approx Degree of Impairment: 50.57%   Standardized Sco

## 2020-03-13 NOTE — CM/SW NOTE
ETIENNE received notification from [facility] who states the pt is Out of the service area. ETIENNE sent the referral to [facility]     ETIENNE received [facility] orders, F2F completed. ETIENNE notified [facility] Orders and F2F via [address] . ETIENNE updated [facility] on discharge date.

## 2020-03-13 NOTE — CM/SW NOTE
SW received call from pt who is agreeable for referrals to be sent to the [facility]. SW asked [facility] to send referrals to the SNFs. [facility] currently [facility] no beds until Next Thursday.      Per conversation with the RN

## 2020-03-14 VITALS
TEMPERATURE: 98 F | WEIGHT: 256 LBS | OXYGEN SATURATION: 94 % | BODY MASS INDEX: 45.36 KG/M2 | RESPIRATION RATE: 18 BRPM | HEIGHT: 63 IN | DIASTOLIC BLOOD PRESSURE: 60 MMHG | SYSTOLIC BLOOD PRESSURE: 106 MMHG | HEART RATE: 64 BPM

## 2020-03-14 PROCEDURE — 97530 THERAPEUTIC ACTIVITIES: CPT

## 2020-03-14 PROCEDURE — 97116 GAIT TRAINING THERAPY: CPT

## 2020-03-14 PROCEDURE — 97110 THERAPEUTIC EXERCISES: CPT

## 2020-03-14 NOTE — PLAN OF CARE
Comments: Pt is alert. Vitals monitored. CPAP @HS. RT#55. LBM @HS, MOM held for pt report of \"multiple BMs today\". Voiding ok of urine. Magda general diet. Gauze and tape over site is c/d/I. Right hinge brace intact, OOB x1a/walker.  PRN Percocet for pain m affect risk of falls.   - [place] fall precautions as indicated by assessment.  - Educate pt/family on patient safety including physical limitations  - Instruct pt to call for assistance with activity based on assessment  - Modify environment to reduce ri function  Description  INTERVENTIONS:  - Assess patient stability and activity tolerance for standing, transferring and ambulating w/ or w/o assistive devices  - Assist with transfers and ambulation using safe patient handling equipment as needed  - Ensure

## 2020-03-14 NOTE — PLAN OF CARE
Problem: PAIN - ADULT  Goal: Verbalizes/displays adequate comfort level or patient's stated pain goal  Description  INTERVENTIONS:  - Encourage pt to monitor pain and request assistance  - Assess pain using appropriate pain scale  - Administer analgesics appropriate  - Assess patient's ability to be responsible for managing their own health  - Refer to Case Management Department for coordinating discharge planning if the patient needs post-hospital services based on physician/LIP order or complex needs rel

## 2020-03-14 NOTE — CM/SW NOTE
ETIENNE was notified pt was cleared to DC home today with [facility]. Orders/f2f Entered. ETIENNE called [facility] and spoke to on call team/Joan and notified them to update intake that pt discharged  Home today.      ETIENNE/BAN to remain available for support and/or disc

## 2020-03-14 NOTE — PROGRESS NOTES
S: she has controlled back pain no leg pain. She feels a little stronger    Inspection:  Awake alert No acute distress. No difficulty breathing ambulating to restroom.   Moving about w/o difficluty    Blood pressure 124/80, pulse 77, temperature 97.8 °F (3

## 2020-03-14 NOTE — PHYSICAL THERAPY NOTE
PHYSICAL THERAPY TREATMENT NOTE - INPATIENT     Room Number: 425/425-A       Presenting Problem: Lumbar IB fusion    Problem List  Active Problems:    Acquired scoliosis      PHYSICAL THERAPY ASSESSMENT     Pt seen BID. Pt educated on deep breathing,relaxat from another person does the patient currently need. ..   -   Moving to and from a bed to a chair (including a wheelchair)?: A Little   -   Need to walk in hospital room?: A Little   -   Climbing 3-5 steps with a railing?: A Lot     AM-PAC Score:  Raw Score

## 2020-03-15 NOTE — DISCHARGE SUMMARY
[facility] Internal Medicine Discharge Summary   Patient ID:  Paloma Parra  [id number]  21 year old  6/7/1970    Admit date: 3/11/2020    Discharge date and time: 3/14/2020 12:08 PM     Attending Physician: No att. providers found     [address] a 54year old female with PMH sig for Asthma, depression, anxiety, arrhythmia, BRENDEN on CPAP, HLD, anxiety, who presents for lumbar fusion.   Patient notes pain in her low back with no radiation, denies chest pain or sob, no nausea or vomiting, no headaches o VERTEBRAL BODIES:   There are hypertrophic changes of the lower lumbar facet joints. Small anterior endplate osteophytes seen within the lumbar vertebral bodies. There is no acute fracture or dislocation.   Postoperative changes of pedicle screw and posteri of discharge feels well, pain controlled, no CP or SOB.   No n.v. voiding, passing gas    Exam   03/14/20  1048   BP: 106/60   Pulse: 64   Resp: 18   Temp: 98.1 °F (36.7 °C)     No acute distress, alert and orientedx3  Lungs Clear  Heart Regular  Abdomen Be

## 2020-10-07 PROBLEM — M54.12 BRACHIAL (CERVICAL) NEURITIS: Status: ACTIVE | Noted: 2020-10-07

## 2020-10-08 PROBLEM — M48.02 SPINAL STENOSIS IN CERVICAL REGION: Status: ACTIVE | Noted: 2020-10-08

## 2021-02-12 PROBLEM — M47.817 LUMBOSACRAL SPONDYLOSIS WITHOUT MYELOPATHY: Status: ACTIVE | Noted: 2021-02-12

## 2021-02-12 PROBLEM — G56.01 CARPAL TUNNEL SYNDROME OF RIGHT WRIST: Status: ACTIVE | Noted: 2021-02-12

## 2021-04-09 ENCOUNTER — LAB ENCOUNTER (OUTPATIENT)
Dept: LAB | Facility: HOSPITAL | Age: 51
End: 2021-04-09
Attending: ORTHOPAEDIC SURGERY
Payer: COMMERCIAL

## 2021-04-09 DIAGNOSIS — Z01.818 PREOPERATIVE TESTING: ICD-10-CM

## 2021-04-12 ENCOUNTER — ANESTHESIA EVENT (OUTPATIENT)
Dept: SURGERY | Facility: HOSPITAL | Age: 51
End: 2021-04-12
Payer: COMMERCIAL

## 2021-04-12 ENCOUNTER — ANESTHESIA (OUTPATIENT)
Dept: SURGERY | Facility: HOSPITAL | Age: 51
End: 2021-04-12
Payer: COMMERCIAL

## 2021-04-12 ENCOUNTER — HOSPITAL ENCOUNTER (OUTPATIENT)
Facility: HOSPITAL | Age: 51
Setting detail: HOSPITAL OUTPATIENT SURGERY
Discharge: HOME | End: 2021-04-12
Attending: ORTHOPAEDIC SURGERY | Admitting: ORTHOPAEDIC SURGERY
Payer: COMMERCIAL

## 2021-04-12 VITALS
TEMPERATURE: 98 F | SYSTOLIC BLOOD PRESSURE: 117 MMHG | OXYGEN SATURATION: 94 % | RESPIRATION RATE: 16 BRPM | BODY MASS INDEX: 49.82 KG/M2 | HEART RATE: 86 BPM | HEIGHT: 63 IN | DIASTOLIC BLOOD PRESSURE: 50 MMHG | WEIGHT: 281.19 LBS

## 2021-04-12 DIAGNOSIS — M79.645 CHRONIC THUMB PAIN, LEFT: ICD-10-CM

## 2021-04-12 DIAGNOSIS — G89.29 CHRONIC THUMB PAIN, LEFT: ICD-10-CM

## 2021-04-12 DIAGNOSIS — Z01.818 PREOPERATIVE TESTING: Primary | ICD-10-CM

## 2021-04-12 PROCEDURE — 64415 NJX AA&/STRD BRCH PLXS IMG: CPT | Performed by: ORTHOPAEDIC SURGERY

## 2021-04-12 PROCEDURE — 0RQT0ZZ REPAIR LEFT CARPOMETACARPAL JOINT, OPEN APPROACH: ICD-10-PCS | Performed by: ORTHOPAEDIC SURGERY

## 2021-04-12 PROCEDURE — 0LX80ZZ TRANSFER LEFT HAND TENDON, OPEN APPROACH: ICD-10-PCS | Performed by: ORTHOPAEDIC SURGERY

## 2021-04-12 PROCEDURE — 81025 URINE PREGNANCY TEST: CPT

## 2021-04-12 PROCEDURE — 76942 ECHO GUIDE FOR BIOPSY: CPT | Performed by: ORTHOPAEDIC SURGERY

## 2021-04-12 PROCEDURE — 3E0T3BZ INTRODUCTION OF ANESTHETIC AGENT INTO PERIPHERAL NERVES AND PLEXI, PERCUTANEOUS APPROACH: ICD-10-PCS | Performed by: ANESTHESIOLOGY

## 2021-04-12 RX ORDER — MORPHINE SULFATE 4 MG/ML
2 INJECTION, SOLUTION INTRAMUSCULAR; INTRAVENOUS EVERY 10 MIN PRN
Status: DISCONTINUED | OUTPATIENT
Start: 2021-04-12 | End: 2021-04-12

## 2021-04-12 RX ORDER — HYDROMORPHONE HYDROCHLORIDE 1 MG/ML
0.4 INJECTION, SOLUTION INTRAMUSCULAR; INTRAVENOUS; SUBCUTANEOUS EVERY 5 MIN PRN
Status: DISCONTINUED | OUTPATIENT
Start: 2021-04-12 | End: 2021-04-12

## 2021-04-12 RX ORDER — ACETAMINOPHEN 500 MG
1000 TABLET ORAL ONCE
Status: COMPLETED | OUTPATIENT
Start: 2021-04-12 | End: 2021-04-12

## 2021-04-12 RX ORDER — MORPHINE SULFATE 4 MG/ML
4 INJECTION, SOLUTION INTRAMUSCULAR; INTRAVENOUS EVERY 10 MIN PRN
Status: DISCONTINUED | OUTPATIENT
Start: 2021-04-12 | End: 2021-04-12

## 2021-04-12 RX ORDER — HALOPERIDOL 5 MG/ML
0.25 INJECTION INTRAMUSCULAR ONCE AS NEEDED
Status: DISCONTINUED | OUTPATIENT
Start: 2021-04-12 | End: 2021-04-12

## 2021-04-12 RX ORDER — MORPHINE SULFATE 10 MG/ML
6 INJECTION, SOLUTION INTRAMUSCULAR; INTRAVENOUS EVERY 10 MIN PRN
Status: DISCONTINUED | OUTPATIENT
Start: 2021-04-12 | End: 2021-04-12

## 2021-04-12 RX ORDER — SODIUM CHLORIDE, SODIUM LACTATE, POTASSIUM CHLORIDE, CALCIUM CHLORIDE 600; 310; 30; 20 MG/100ML; MG/100ML; MG/100ML; MG/100ML
INJECTION, SOLUTION INTRAVENOUS CONTINUOUS
Status: DISCONTINUED | OUTPATIENT
Start: 2021-04-12 | End: 2021-04-12

## 2021-04-12 RX ORDER — DEXAMETHASONE SODIUM PHOSPHATE 10 MG/ML
INJECTION, SOLUTION INTRAMUSCULAR; INTRAVENOUS AS NEEDED
Status: DISCONTINUED | OUTPATIENT
Start: 2021-04-12 | End: 2021-04-12 | Stop reason: SURG

## 2021-04-12 RX ORDER — HYDROMORPHONE HYDROCHLORIDE 1 MG/ML
0.2 INJECTION, SOLUTION INTRAMUSCULAR; INTRAVENOUS; SUBCUTANEOUS EVERY 5 MIN PRN
Status: DISCONTINUED | OUTPATIENT
Start: 2021-04-12 | End: 2021-04-12

## 2021-04-12 RX ORDER — LIDOCAINE HYDROCHLORIDE 10 MG/ML
INJECTION, SOLUTION EPIDURAL; INFILTRATION; INTRACAUDAL; PERINEURAL AS NEEDED
Status: DISCONTINUED | OUTPATIENT
Start: 2021-04-12 | End: 2021-04-12 | Stop reason: SURG

## 2021-04-12 RX ORDER — ROPIVACAINE HYDROCHLORIDE 5 MG/ML
INJECTION, SOLUTION EPIDURAL; INFILTRATION; PERINEURAL AS NEEDED
Status: DISCONTINUED | OUTPATIENT
Start: 2021-04-12 | End: 2021-04-12 | Stop reason: SURG

## 2021-04-12 RX ORDER — MIDAZOLAM HYDROCHLORIDE 1 MG/ML
INJECTION INTRAMUSCULAR; INTRAVENOUS AS NEEDED
Status: DISCONTINUED | OUTPATIENT
Start: 2021-04-12 | End: 2021-04-12 | Stop reason: SURG

## 2021-04-12 RX ORDER — ONDANSETRON 2 MG/ML
4 INJECTION INTRAMUSCULAR; INTRAVENOUS ONCE AS NEEDED
Status: DISCONTINUED | OUTPATIENT
Start: 2021-04-12 | End: 2021-04-12

## 2021-04-12 RX ORDER — HYDROCODONE BITARTRATE AND ACETAMINOPHEN 5; 325 MG/1; MG/1
1 TABLET ORAL AS NEEDED
Status: DISCONTINUED | OUTPATIENT
Start: 2021-04-12 | End: 2021-04-12

## 2021-04-12 RX ORDER — HYDROCODONE BITARTRATE AND ACETAMINOPHEN 5; 325 MG/1; MG/1
1 TABLET ORAL EVERY 6 HOURS PRN
Qty: 25 TABLET | Refills: 0 | Status: SHIPPED | OUTPATIENT
Start: 2021-04-12 | End: 2021-11-15

## 2021-04-12 RX ORDER — PROCHLORPERAZINE EDISYLATE 5 MG/ML
5 INJECTION INTRAMUSCULAR; INTRAVENOUS ONCE AS NEEDED
Status: DISCONTINUED | OUTPATIENT
Start: 2021-04-12 | End: 2021-04-12

## 2021-04-12 RX ORDER — HYDROCODONE BITARTRATE AND ACETAMINOPHEN 5; 325 MG/1; MG/1
2 TABLET ORAL AS NEEDED
Status: DISCONTINUED | OUTPATIENT
Start: 2021-04-12 | End: 2021-04-12

## 2021-04-12 RX ORDER — FAMOTIDINE 20 MG/1
20 TABLET ORAL ONCE
Status: COMPLETED | OUTPATIENT
Start: 2021-04-12 | End: 2021-04-12

## 2021-04-12 RX ORDER — NALOXONE HYDROCHLORIDE 0.4 MG/ML
80 INJECTION, SOLUTION INTRAMUSCULAR; INTRAVENOUS; SUBCUTANEOUS AS NEEDED
Status: DISCONTINUED | OUTPATIENT
Start: 2021-04-12 | End: 2021-04-12

## 2021-04-12 RX ORDER — HYDROMORPHONE HYDROCHLORIDE 1 MG/ML
0.6 INJECTION, SOLUTION INTRAMUSCULAR; INTRAVENOUS; SUBCUTANEOUS EVERY 5 MIN PRN
Status: DISCONTINUED | OUTPATIENT
Start: 2021-04-12 | End: 2021-04-12

## 2021-04-12 RX ORDER — METOCLOPRAMIDE 10 MG/1
10 TABLET ORAL ONCE
Status: DISCONTINUED | OUTPATIENT
Start: 2021-04-12 | End: 2021-04-12 | Stop reason: HOSPADM

## 2021-04-12 RX ADMIN — SODIUM CHLORIDE, SODIUM LACTATE, POTASSIUM CHLORIDE, CALCIUM CHLORIDE: 600; 310; 30; 20 INJECTION, SOLUTION INTRAVENOUS at 16:59:00

## 2021-04-12 RX ADMIN — DEXAMETHASONE SODIUM PHOSPHATE 4 MG: 10 INJECTION, SOLUTION INTRAMUSCULAR; INTRAVENOUS at 15:30:00

## 2021-04-12 RX ADMIN — ROPIVACAINE HYDROCHLORIDE 30 ML: 5 INJECTION, SOLUTION EPIDURAL; INFILTRATION; PERINEURAL at 15:30:00

## 2021-04-12 RX ADMIN — LIDOCAINE HYDROCHLORIDE 2 ML: 10 INJECTION, SOLUTION EPIDURAL; INFILTRATION; INTRACAUDAL; PERINEURAL at 15:26:00

## 2021-04-12 RX ADMIN — MIDAZOLAM HYDROCHLORIDE 2 MG: 1 INJECTION INTRAMUSCULAR; INTRAVENOUS at 15:26:00

## 2021-04-12 RX ADMIN — LIDOCAINE HYDROCHLORIDE 5 ML: 10 INJECTION, SOLUTION EPIDURAL; INFILTRATION; INTRACAUDAL; PERINEURAL at 16:59:00

## 2021-04-12 NOTE — ANESTHESIA PREPROCEDURE EVALUATION
Anesthesia PreOp Note    HPI:     Sapna Gipson is a 52year old female who presents for preoperative consultation requested by: Elisha Heredia MD    Date of Surgery: 4/12/2021    Procedure(s):  LEFT CARPOMETACARPAL JOINT ARTHROPLASTY THUMB WITH [place] Daily generalized or localized, lower leg         Date Noted: 06/20/2013      Body mass index 29.0-29.9, adult         Date Noted: 06/20/2013        Past Medical History:   Diagnosis Date   • Anxiety    • Arrhythmia     Has an irregular beat occasionally.  Has be [address]   • OTHER SURGICAL HISTORY     • QUADRICEPS RUPTURE REPAIR Left 4/11/2018    Performed by Laurin Mortimer, MD at [facility] OR   • [address] [facility]     • TOTAL KNEE REPLACEMENT         CELECOXIB 200 MG Oral Cap, TAKE 1 CAP Known Allergies    Family History   Problem Relation Age of Onset   • Cancer Father    • Diabetes Father    • Heart Disorder Father    • Hypertension Father    • Lipids Father    • Lipids Mother    • Lipids Sister    • Cancer Sister    • Lipids Brother Abused:     Available pre-op labs reviewed. Lab Results   Component Value Date    URINEPREG Negative 04/12/2021             Vital Signs: Body mass index is 49.81 kg/m². height is 1.6 m (5' 3\") and weight is 127.6 kg (281 lb 3.2 oz).  Her oral temperatu arrest, infection, bleeding, and  nerve damage. The patient desires the proposed regional anesthetic as planned.   ADRIANO NAVARRO  4/12/2021 3:32 PM

## 2021-04-12 NOTE — ANESTHESIA POSTPROCEDURE EVALUATION
Patient: Teofilo Schaffer    Procedure Summary     Date: 04/12/21 Room / Location: [address] [place] [address] OR 08 / [address] [place] [address] OR    Anesthesia Start: 2652 Anesthesia Stop:     Procedure: LEFT CARPOMETACARPAL JOINT ARTHROPLASTY THUMB WITH LIGAMENT RECONSTUCTION USING FLE

## 2021-04-12 NOTE — ANESTHESIA PROCEDURE NOTES
Peripheral Block  Performed by: Hunter Cho MD  Authorized by: Hunter Cho MD       General Information and Staff    Start Time:  4/12/2021 3:26 PM  End Time:  4/12/2021 3:31 PM  Anesthesiologist:  Hunter Cho MD  Performed by:

## 2021-04-12 NOTE — ANESTHESIA PROCEDURE NOTES
Airway  Urgency: Elective      General Information and Staff    Patient location during procedure: OR  Anesthesiologist: Dayami Vance MD  Performed: anesthesiologist     Indications and Patient Condition  Indications for airway management: anesthesia  Sp

## 2021-04-12 NOTE — OPERATIVE REPORT
Date of Procedure: 4/12/2021   PREOP-OP Diagnosis: left thumb [facility] arthritis  POST-OP Diagnosis: same    LEFT CARPOMETACARPAL JOINT ARTHROPLASTY THUMB WITH LIGAMENT RECONSTUCTION USING FLEXOR CARPI RADIALIS AUTOGRAFT     CPT: Interposition arthroplasty, i The radial artery was carefully exposed and protected throughout the procedure. The flexor carpi radialis was exposed and and protected as it dove underneath the trapezial ridge.  The trapezium was freed from the surrounding tissues under direct visualizati confirmed by manual testing and excellent stability was achieved. The remaining tendon was then “anchovied” using a dolphin stitch of Ethibond. The tendon was placed back into the defect left by the resected trapezium.  The retained capsule from the joint w

## 2021-04-15 NOTE — H&P
H&P was reviewed, no new changes  Heart RRR  Lungs non labored    I discussed with the patient and/or legal representative the potential benefits, risks and side effects of this procedure; the likelihood of the patient achieving goals; and potential proble Laterality Date   • ADJUSTMENT GASTRIC BAND       • BACK SURGERY   11/2015   • CERVICAL EPIDURAL INJECTION N/A 10/16/2020     Performed by Tony Gillespie DO at [address] [facility]   • FAR LATERAL LUMBAR INTERBODY FUSION 2 LEVEL N/A 3/11/2020     Pe tobacco: Current User      Tobacco comment: does vapor    Substance and Sexual Activity      Alcohol use: Yes        Alcohol/week: 0.0 standard drinks        Comment: socially      Drug use:  No        Family History:        Family History   Problem Relatio units were functioning independently at a 5/5 strength.     Joint Stability: The basilar joint grind test was positive, with reproduction of pain.  No obvious dislocation, subluxation or significant laxity in other joints of the upper extremity.     Other P

## 2021-04-27 PROBLEM — Z48.89 AFTERCARE FOLLOWING SURGERY: Status: ACTIVE | Noted: 2021-04-27

## 2021-08-29 ENCOUNTER — LAB REQUISITION (OUTPATIENT)
Dept: SURGERY | Age: 51
End: 2021-08-29
Payer: COMMERCIAL

## 2021-08-29 DIAGNOSIS — Z01.818 PREOP EXAMINATION: ICD-10-CM

## 2021-08-31 LAB — SARS-COV-2 RNA RESP QL NAA+PROBE: NOT DETECTED

## 2022-02-08 PROBLEM — K95.09 GASTRIC BAND MALFUNCTION: Status: ACTIVE | Noted: 2022-02-08

## 2022-02-08 PROBLEM — E66.01 MORBID OBESITY (HCC): Status: ACTIVE | Noted: 2022-02-08

## 2023-12-18 ENCOUNTER — LAB REQUISITION (OUTPATIENT)
Dept: LAB | Facility: HOSPITAL | Age: 53
End: 2023-12-18
Payer: COMMERCIAL

## 2023-12-18 DIAGNOSIS — A41.9 SEPSIS, UNSPECIFIED ORGANISM (HCC): ICD-10-CM

## 2023-12-18 DIAGNOSIS — L03.311 CELLULITIS OF ABDOMINAL WALL: ICD-10-CM

## 2023-12-18 DIAGNOSIS — M72.6 NECROTIZING FASCIITIS (HCC): ICD-10-CM

## 2023-12-18 LAB
ALBUMIN SERPL-MCNC: 3.9 G/DL (ref 3.2–4.8)
ALBUMIN/GLOB SERPL: 1.1 {RATIO} (ref 1–2)
ALP LIVER SERPL-CCNC: 93 U/L
ALT SERPL-CCNC: 25 U/L
ANION GAP SERPL CALC-SCNC: 11 MMOL/L (ref 0–18)
AST SERPL-CCNC: 32 U/L (ref ?–34)
BASOPHILS # BLD AUTO: 0.05 X10(3) UL (ref 0–0.2)
BASOPHILS NFR BLD AUTO: 0.5 %
BILIRUB SERPL-MCNC: 0.5 MG/DL (ref 0.3–1.2)
BUN BLD-MCNC: 8 MG/DL (ref 9–23)
BUN/CREAT SERPL: 13.1 (ref 10–20)
CALCIUM BLD-MCNC: 9.4 MG/DL (ref 8.7–10.4)
CHLORIDE SERPL-SCNC: 107 MMOL/L (ref 98–112)
CO2 SERPL-SCNC: 20 MMOL/L (ref 21–32)
CREAT BLD-MCNC: 0.61 MG/DL
CRP SERPL-MCNC: 1.6 MG/DL (ref ?–1)
DEPRECATED RDW RBC AUTO: 48.5 FL (ref 35.1–46.3)
EGFRCR SERPLBLD CKD-EPI 2021: 108 ML/MIN/1.73M2 (ref 60–?)
EOSINOPHIL # BLD AUTO: 0.36 X10(3) UL (ref 0–0.7)
EOSINOPHIL NFR BLD AUTO: 3.7 %
ERYTHROCYTE [DISTWIDTH] IN BLOOD BY AUTOMATED COUNT: 14.9 % (ref 11–15)
ERYTHROCYTE [SEDIMENTATION RATE] IN BLOOD: 87 MM/HR
GLOBULIN PLAS-MCNC: 3.7 G/DL (ref 2.8–4.4)
GLUCOSE BLD-MCNC: 92 MG/DL (ref 70–99)
HCT VFR BLD AUTO: 40.2 %
HGB BLD-MCNC: 13.2 G/DL
IMM GRANULOCYTES # BLD AUTO: 0.03 X10(3) UL (ref 0–1)
IMM GRANULOCYTES NFR BLD: 0.3 %
LYMPHOCYTES # BLD AUTO: 2.34 X10(3) UL (ref 1–4)
LYMPHOCYTES NFR BLD AUTO: 24 %
MCH RBC QN AUTO: 29.4 PG (ref 26–34)
MCHC RBC AUTO-ENTMCNC: 32.8 G/DL (ref 31–37)
MCV RBC AUTO: 89.5 FL
MONOCYTES # BLD AUTO: 0.63 X10(3) UL (ref 0.1–1)
MONOCYTES NFR BLD AUTO: 6.5 %
NEUTROPHILS # BLD AUTO: 6.32 X10 (3) UL (ref 1.5–7.7)
NEUTROPHILS # BLD AUTO: 6.32 X10(3) UL (ref 1.5–7.7)
NEUTROPHILS NFR BLD AUTO: 65 %
OSMOLALITY SERPL CALC.SUM OF ELEC: 284 MOSM/KG (ref 275–295)
PLATELET # BLD AUTO: 584 10(3)UL (ref 150–450)
POTASSIUM SERPL-SCNC: 4.2 MMOL/L (ref 3.5–5.1)
PROT SERPL-MCNC: 7.6 G/DL (ref 5.7–8.2)
RBC # BLD AUTO: 4.49 X10(6)UL
SODIUM SERPL-SCNC: 138 MMOL/L (ref 136–145)
WBC # BLD AUTO: 9.7 X10(3) UL (ref 4–11)

## 2023-12-18 PROCEDURE — 80053 COMPREHEN METABOLIC PANEL: CPT | Performed by: INTERNAL MEDICINE

## 2023-12-18 PROCEDURE — 86140 C-REACTIVE PROTEIN: CPT | Performed by: INTERNAL MEDICINE

## 2023-12-18 PROCEDURE — 85652 RBC SED RATE AUTOMATED: CPT | Performed by: INTERNAL MEDICINE

## 2023-12-18 PROCEDURE — 85025 COMPLETE CBC W/AUTO DIFF WBC: CPT | Performed by: INTERNAL MEDICINE

## 2023-12-28 ENCOUNTER — LAB REQUISITION (OUTPATIENT)
Dept: LAB | Facility: HOSPITAL | Age: 53
End: 2023-12-28
Payer: COMMERCIAL

## 2023-12-28 DIAGNOSIS — A41.9 SEPSIS, UNSPECIFIED ORGANISM (HCC): ICD-10-CM

## 2023-12-28 LAB
ALBUMIN SERPL-MCNC: 3.7 G/DL (ref 3.2–4.8)
ALBUMIN/GLOB SERPL: 1.1 {RATIO} (ref 1–2)
ALP LIVER SERPL-CCNC: 83 U/L
ALT SERPL-CCNC: 24 U/L
ANION GAP SERPL CALC-SCNC: 6 MMOL/L (ref 0–18)
AST SERPL-CCNC: 26 U/L (ref ?–34)
BASOPHILS # BLD AUTO: 0.05 X10(3) UL (ref 0–0.2)
BASOPHILS NFR BLD AUTO: 0.6 %
BILIRUB SERPL-MCNC: 0.5 MG/DL (ref 0.3–1.2)
BUN BLD-MCNC: <5 MG/DL (ref 9–23)
CALCIUM BLD-MCNC: 9.2 MG/DL (ref 8.7–10.4)
CHLORIDE SERPL-SCNC: 108 MMOL/L (ref 98–112)
CO2 SERPL-SCNC: 23 MMOL/L (ref 21–32)
CREAT BLD-MCNC: 0.63 MG/DL
CRP SERPL-MCNC: 1 MG/DL (ref ?–1)
DEPRECATED RDW RBC AUTO: 46.5 FL (ref 35.1–46.3)
EGFRCR SERPLBLD CKD-EPI 2021: 107 ML/MIN/1.73M2 (ref 60–?)
EOSINOPHIL # BLD AUTO: 0.35 X10(3) UL (ref 0–0.7)
EOSINOPHIL NFR BLD AUTO: 3.9 %
ERYTHROCYTE [DISTWIDTH] IN BLOOD BY AUTOMATED COUNT: 14.2 % (ref 11–15)
ERYTHROCYTE [SEDIMENTATION RATE] IN BLOOD: 65 MM/HR
GLOBULIN PLAS-MCNC: 3.3 G/DL (ref 2.8–4.4)
GLUCOSE BLD-MCNC: 80 MG/DL (ref 70–99)
HCT VFR BLD AUTO: 40.8 %
HGB BLD-MCNC: 12.7 G/DL
IMM GRANULOCYTES # BLD AUTO: 0.01 X10(3) UL (ref 0–1)
IMM GRANULOCYTES NFR BLD: 0.1 %
LYMPHOCYTES # BLD AUTO: 2.15 X10(3) UL (ref 1–4)
LYMPHOCYTES NFR BLD AUTO: 24.2 %
MCH RBC QN AUTO: 27.9 PG (ref 26–34)
MCHC RBC AUTO-ENTMCNC: 31.1 G/DL (ref 31–37)
MCV RBC AUTO: 89.5 FL
MONOCYTES # BLD AUTO: 0.58 X10(3) UL (ref 0.1–1)
MONOCYTES NFR BLD AUTO: 6.5 %
NEUTROPHILS # BLD AUTO: 5.74 X10 (3) UL (ref 1.5–7.7)
NEUTROPHILS # BLD AUTO: 5.74 X10(3) UL (ref 1.5–7.7)
NEUTROPHILS NFR BLD AUTO: 64.7 %
PLATELET # BLD AUTO: 560 10(3)UL (ref 150–450)
POTASSIUM SERPL-SCNC: 3.6 MMOL/L (ref 3.5–5.1)
PROT SERPL-MCNC: 7 G/DL (ref 5.7–8.2)
RBC # BLD AUTO: 4.56 X10(6)UL
SODIUM SERPL-SCNC: 137 MMOL/L (ref 136–145)
WBC # BLD AUTO: 8.9 X10(3) UL (ref 4–11)

## 2023-12-28 PROCEDURE — 86140 C-REACTIVE PROTEIN: CPT | Performed by: INTERNAL MEDICINE

## 2023-12-28 PROCEDURE — 85652 RBC SED RATE AUTOMATED: CPT | Performed by: INTERNAL MEDICINE

## 2023-12-28 PROCEDURE — 80053 COMPREHEN METABOLIC PANEL: CPT | Performed by: INTERNAL MEDICINE

## 2023-12-28 PROCEDURE — 85025 COMPLETE CBC W/AUTO DIFF WBC: CPT | Performed by: INTERNAL MEDICINE

## 2024-01-29 ENCOUNTER — LAB REQUISITION (OUTPATIENT)
Dept: LAB | Facility: HOSPITAL | Age: 54
End: 2024-01-29
Payer: COMMERCIAL

## 2024-01-29 DIAGNOSIS — A41.9 SEPSIS, UNSPECIFIED ORGANISM (HCC): ICD-10-CM

## 2024-01-29 DIAGNOSIS — L03.311 CELLULITIS OF ABDOMINAL WALL: ICD-10-CM

## 2024-01-29 DIAGNOSIS — M72.6 NECROTIZING FASCIITIS (HCC): ICD-10-CM

## 2024-01-29 LAB
ALBUMIN SERPL-MCNC: 3.9 G/DL (ref 3.2–4.8)
ALBUMIN/GLOB SERPL: 1.3 {RATIO} (ref 1–2)
ALP LIVER SERPL-CCNC: 120 U/L
ALT SERPL-CCNC: 20 U/L
ANION GAP SERPL CALC-SCNC: 8 MMOL/L (ref 0–18)
AST SERPL-CCNC: 24 U/L (ref ?–34)
BASOPHILS # BLD AUTO: 0.05 X10(3) UL (ref 0–0.2)
BASOPHILS NFR BLD AUTO: 0.4 %
BILIRUB SERPL-MCNC: 0.6 MG/DL (ref 0.3–1.2)
BUN BLD-MCNC: 7 MG/DL (ref 9–23)
BUN/CREAT SERPL: 11.7 (ref 10–20)
CALCIUM BLD-MCNC: 9.1 MG/DL (ref 8.7–10.4)
CHLORIDE SERPL-SCNC: 107 MMOL/L (ref 98–112)
CO2 SERPL-SCNC: 23 MMOL/L (ref 21–32)
CREAT BLD-MCNC: 0.6 MG/DL
CRP SERPL-MCNC: <0.4 MG/DL (ref ?–1)
DEPRECATED RDW RBC AUTO: 45.1 FL (ref 35.1–46.3)
EGFRCR SERPLBLD CKD-EPI 2021: 109 ML/MIN/1.73M2 (ref 60–?)
EOSINOPHIL # BLD AUTO: 0.11 X10(3) UL (ref 0–0.7)
EOSINOPHIL NFR BLD AUTO: 1 %
ERYTHROCYTE [DISTWIDTH] IN BLOOD BY AUTOMATED COUNT: 14 % (ref 11–15)
ERYTHROCYTE [SEDIMENTATION RATE] IN BLOOD: 48 MM/HR
FASTING STATUS PATIENT QL REPORTED: NO
GLOBULIN PLAS-MCNC: 3.1 G/DL (ref 2.8–4.4)
GLUCOSE BLD-MCNC: 78 MG/DL (ref 70–99)
HCT VFR BLD AUTO: 44.8 %
HGB BLD-MCNC: 14 G/DL
IMM GRANULOCYTES # BLD AUTO: 0.03 X10(3) UL (ref 0–1)
IMM GRANULOCYTES NFR BLD: 0.3 %
LYMPHOCYTES # BLD AUTO: 2.15 X10(3) UL (ref 1–4)
LYMPHOCYTES NFR BLD AUTO: 19.3 %
MCH RBC QN AUTO: 28.1 PG (ref 26–34)
MCHC RBC AUTO-ENTMCNC: 31.3 G/DL (ref 31–37)
MCV RBC AUTO: 89.8 FL
MONOCYTES # BLD AUTO: 0.57 X10(3) UL (ref 0.1–1)
MONOCYTES NFR BLD AUTO: 5.1 %
NEUTROPHILS # BLD AUTO: 8.21 X10 (3) UL (ref 1.5–7.7)
NEUTROPHILS # BLD AUTO: 8.21 X10(3) UL (ref 1.5–7.7)
NEUTROPHILS NFR BLD AUTO: 73.9 %
OSMOLALITY SERPL CALC.SUM OF ELEC: 283 MOSM/KG (ref 275–295)
PLATELET # BLD AUTO: 477 10(3)UL (ref 150–450)
POTASSIUM SERPL-SCNC: 3.8 MMOL/L (ref 3.5–5.1)
PROT SERPL-MCNC: 7 G/DL (ref 5.7–8.2)
RBC # BLD AUTO: 4.99 X10(6)UL
SODIUM SERPL-SCNC: 138 MMOL/L (ref 136–145)
WBC # BLD AUTO: 11.1 X10(3) UL (ref 4–11)

## 2024-01-29 PROCEDURE — 85652 RBC SED RATE AUTOMATED: CPT | Performed by: INTERNAL MEDICINE

## 2024-01-29 PROCEDURE — 86140 C-REACTIVE PROTEIN: CPT | Performed by: INTERNAL MEDICINE

## 2024-01-29 PROCEDURE — 80053 COMPREHEN METABOLIC PANEL: CPT | Performed by: INTERNAL MEDICINE

## 2024-01-29 PROCEDURE — 85025 COMPLETE CBC W/AUTO DIFF WBC: CPT | Performed by: INTERNAL MEDICINE

## (undated) DEVICE — ENDOPATH 5MM ENDOSCOPIC BLUNT TIP DISSECTORS (12 POUCHES CONTAINING 3 DISSECTORS EACH): Brand: ENDOPATH

## (undated) DEVICE — 3M™ MEDITPORE™ SOFT CLOTH TAPE 6 IN X 10 YD 12 ROLLS/CASE 2966: Brand: 3M™ MEDIPORE™

## (undated) DEVICE — STERILE LATEX POWDER-FREE SURGICAL GLOVESWITH NITRILE COATING: Brand: PROTEXIS

## (undated) DEVICE — FAN SPRAY KIT: Brand: PULSAVAC®

## (undated) DEVICE — PROXIMATE SKIN STAPLERS (35 WIDE) CONTAINS 35 STAINLESS STEEL STAPLES (FIXED HEAD): Brand: PROXIMATE

## (undated) DEVICE — 3M™ STERI-STRIP™ REINFORCED ADHESIVE SKIN CLOSURES, R1547, 1/2 IN X 4 IN (12 MM X 100 MM), 6 STRIPS/ENVELOPE: Brand: 3M™ STERI-STRIP™

## (undated) DEVICE — INSULATED BLADE ELECTRODE 6.5

## (undated) DEVICE — SUTURE VICRYL 3-0 J442H

## (undated) DEVICE — ADHESIVE MASTISOL 2/3CC VL

## (undated) DEVICE — SUTURE VICRYL 2-0 FS-1

## (undated) DEVICE — NON-ADHERENT STRIPS,OIL EMULSION: Brand: CURITY

## (undated) DEVICE — FLOSEAL HEMOSTATIC MATRIX, 5ML: Brand: FLOSEAL HEMOSTATIC MATRIX

## (undated) DEVICE — OCCLUSIVE GAUZE STRIP,3% BISMUTH TRIBROMOPHENATE IN PETROLATUM BLEND: Brand: XEROFORM

## (undated) DEVICE — SUTURE FIBERWIRE S AR-7200

## (undated) DEVICE — CONTAINER SPEC STR 4OZ GRY LID

## (undated) DEVICE — DRAPE SRG 26X15IN UTL TPE STRL

## (undated) DEVICE — TRAY FOLEY BDX 16F STATLOCK

## (undated) DEVICE — SUTURE VICRYL 0 CT-1

## (undated) DEVICE — DRAPE SLUSH/WARMER W/DISC

## (undated) DEVICE — COTTON UNDERCAST PADDING,REGULAR FINISH: Brand: WEBRIL

## (undated) DEVICE — LAMINECTOMY: Brand: MEDLINE INDUSTRIES, INC.

## (undated) DEVICE — SOL  .9 1000ML BTL

## (undated) DEVICE — C-ARM: Brand: UNBRANDED

## (undated) DEVICE — SPONGE SURG 1IN DIA MED TONSIL

## (undated) DEVICE — GAMMEX® PI HYBRID SIZE 8.5, STERILE POWDER-FREE SURGICAL GLOVE, POLYISOPRENE AND NEOPRENE BLEND: Brand: GAMMEX

## (undated) DEVICE — 450 ML BOTTLE OF 0.05% CHLORHEXIDINE GLUCONATE IN 99.95% STERILE WATER FOR IRRIGATION, USP AND APPLICATOR.: Brand: IRRISEPT ANTIMICROBIAL WOUND LAVAGE

## (undated) DEVICE — KIT SPNL XLIF NRVSN DIL M5

## (undated) DEVICE — TOURNIQUET CUFF 18 STR

## (undated) DEVICE — SUTURE ETHILON 3-0 669H

## (undated) DEVICE — GAMMEX® PI HYBRID SIZE 7.5, STERILE POWDER-FREE SURGICAL GLOVE, POLYISOPRENE AND NEOPRENE BLEND: Brand: GAMMEX

## (undated) DEVICE — SUTURE ETHIBOND EXCEL 3-0 RB

## (undated) DEVICE — GAMMEX® PI HYBRID SIZE 8, STERILE POWDER-FREE SURGICAL GLOVE, POLYISOPRENE AND NEOPRENE BLEND: Brand: GAMMEX

## (undated) DEVICE — BLADE 11 SHRP BP SS SRG STRL

## (undated) DEVICE — SUTURE ETHILON 2-0 FS

## (undated) DEVICE — DRAPE MN CARM

## (undated) DEVICE — SUTURE ETHILON 4-0 1667G

## (undated) DEVICE — UPPER EXTREMITY: Brand: MEDLINE INDUSTRIES, INC.

## (undated) DEVICE — GOWN SURG AERO BLUE PERF XLG

## (undated) DEVICE — 1010 S-DRAPE TOWEL DRAPE 10/BX: Brand: STERI-DRAPE™

## (undated) DEVICE — GAUZE SPONGES,12 PLY: Brand: CURITY

## (undated) DEVICE — TOWEL SURG OR 17X30IN BLUE

## (undated) DEVICE — BATTERY

## (undated) DEVICE — SUTURE MONOCRYL 3-0 Y936H

## (undated) DEVICE — CAUTERY BLADE 2IN INS E1455

## (undated) DEVICE — KNEE IMMOBILIZER: Brand: DEROYAL

## (undated) DEVICE — LOWER EXTREMITY: Brand: MEDLINE INDUSTRIES, INC.

## (undated) DEVICE — CHLORAPREP 26ML APPLICATOR

## (undated) DEVICE — Device: Brand: JELCO

## (undated) DEVICE — 11.1-8GX20.3CMBEVTIPPININTRODU

## (undated) DEVICE — SUTURE VICRYL 2-0 CT-2

## (undated) DEVICE — BAG SRG CLR 36X30IN

## (undated) DEVICE — SOL  .9 3000ML

## (undated) DEVICE — ZIMMER® STERILE DISPOSABLE TOURNIQUET CUFF WITH PLC, DUAL PORT, SINGLE BLADDER, 34 IN. (86 CM)

## (undated) DEVICE — SUCTION CANISTER, 3000CC,SAFELINER: Brand: DEROYAL

## (undated) DEVICE — C-ARMOR C-ARM EQUIPMENT COVERS CLEAR STERILE UNIVERSAL FIT 12 PER CASE: Brand: C-ARMOR

## (undated) DEVICE — TOWEL OR BLU 16X26 STRL

## (undated) DEVICE — ABDOMINAL PAD: Brand: CURITY

## (undated) DEVICE — PADDING 4YDX6IN CTTN STRL WBRL